# Patient Record
Sex: MALE | Race: WHITE | NOT HISPANIC OR LATINO | Employment: FULL TIME | URBAN - METROPOLITAN AREA
[De-identification: names, ages, dates, MRNs, and addresses within clinical notes are randomized per-mention and may not be internally consistent; named-entity substitution may affect disease eponyms.]

---

## 2017-06-23 ENCOUNTER — APPOINTMENT (EMERGENCY)
Dept: RADIOLOGY | Facility: HOSPITAL | Age: 52
End: 2017-06-23
Payer: COMMERCIAL

## 2017-06-23 ENCOUNTER — HOSPITAL ENCOUNTER (EMERGENCY)
Facility: HOSPITAL | Age: 52
Discharge: LEFT BEFORE DISCHARGE | End: 2017-06-23
Admitting: EMERGENCY MEDICINE
Payer: COMMERCIAL

## 2017-06-23 VITALS
RESPIRATION RATE: 20 BRPM | WEIGHT: 140 LBS | SYSTOLIC BLOOD PRESSURE: 117 MMHG | HEIGHT: 72 IN | DIASTOLIC BLOOD PRESSURE: 79 MMHG | BODY MASS INDEX: 18.96 KG/M2 | TEMPERATURE: 98.1 F | HEART RATE: 60 BPM | OXYGEN SATURATION: 97 %

## 2017-06-23 DIAGNOSIS — M79.671 RIGHT FOOT PAIN: ICD-10-CM

## 2017-06-23 DIAGNOSIS — M25.461 SWELLING OF JOINT OF RIGHT KNEE: Primary | ICD-10-CM

## 2017-06-23 LAB
ALBUMIN SERPL BCP-MCNC: 3.4 G/DL (ref 3.5–5)
ALP SERPL-CCNC: 62 U/L (ref 46–116)
ALT SERPL W P-5'-P-CCNC: 73 U/L (ref 12–78)
ANION GAP SERPL CALCULATED.3IONS-SCNC: 6 MMOL/L (ref 4–13)
APPEARANCE FLD: ABNORMAL
AST SERPL W P-5'-P-CCNC: 29 U/L (ref 5–45)
BASOPHILS # BLD AUTO: 0.02 THOUSANDS/ΜL (ref 0–0.1)
BASOPHILS NFR BLD AUTO: 0 % (ref 0–1)
BILIRUB SERPL-MCNC: 0.3 MG/DL (ref 0.2–1)
BUN SERPL-MCNC: 7 MG/DL (ref 5–25)
CALCIUM SERPL-MCNC: 8.9 MG/DL (ref 8.3–10.1)
CHLORIDE SERPL-SCNC: 99 MMOL/L (ref 100–108)
CO2 SERPL-SCNC: 30 MMOL/L (ref 21–32)
COLOR FLD: ABNORMAL
CREAT SERPL-MCNC: 0.65 MG/DL (ref 0.6–1.3)
CRYSTALS SNV QL MICRO: NORMAL
EOSINOPHIL # BLD AUTO: 0.72 THOUSAND/ΜL (ref 0–0.61)
EOSINOPHIL NFR BLD AUTO: 11 % (ref 0–6)
ERYTHROCYTE [DISTWIDTH] IN BLOOD BY AUTOMATED COUNT: 13 % (ref 11.6–15.1)
ERYTHROCYTE [SEDIMENTATION RATE] IN BLOOD: 53 MM/HOUR (ref 0–10)
GFR SERPL CREATININE-BSD FRML MDRD: >60 ML/MIN/1.73SQ M
GLUCOSE SERPL-MCNC: 85 MG/DL (ref 65–140)
HCT VFR BLD AUTO: 34.9 % (ref 36.5–49.3)
HGB BLD-MCNC: 11.6 G/DL (ref 12–17)
LYMPHOCYTES # BLD AUTO: 1.53 THOUSANDS/ΜL (ref 0.6–4.47)
LYMPHOCYTES # SNV MANUAL: 1 %
LYMPHOCYTES NFR BLD AUTO: 24 % (ref 14–44)
MCH RBC QN AUTO: 32.7 PG (ref 26.8–34.3)
MCHC RBC AUTO-ENTMCNC: 33.2 G/DL (ref 31.4–37.4)
MCV RBC AUTO: 98 FL (ref 82–98)
MONOCYTES # BLD AUTO: 0.76 THOUSAND/ΜL (ref 0.17–1.22)
MONOCYTES NFR BLD AUTO: 12 % (ref 4–12)
MONOCYTES NFR SNV MANUAL: 7 %
NEUTROPHILS # BLD AUTO: 3.31 THOUSANDS/ΜL (ref 1.85–7.62)
NEUTROPHILS NFR SNV MANUAL: 92 %
NEUTS SEG NFR BLD AUTO: 53 % (ref 43–75)
PLATELET # BLD AUTO: 160 THOUSANDS/UL (ref 149–390)
PMV BLD AUTO: 10.3 FL (ref 8.9–12.7)
POTASSIUM SERPL-SCNC: 4.1 MMOL/L (ref 3.5–5.3)
PROT SERPL-MCNC: 8.2 G/DL (ref 6.4–8.2)
RBC # BLD AUTO: 3.55 MILLION/UL (ref 3.88–5.62)
SITE: ABNORMAL
SODIUM SERPL-SCNC: 135 MMOL/L (ref 136–145)
TOTAL CELLS COUNTED SPEC: 100
URATE SERPL-MCNC: 7.2 MG/DL (ref 4.2–8)
WBC # BLD AUTO: 6.34 THOUSAND/UL (ref 4.31–10.16)
WBC # FLD MANUAL: ABNORMAL /UL (ref 0–200)

## 2017-06-23 PROCEDURE — 99284 EMERGENCY DEPT VISIT MOD MDM: CPT

## 2017-06-23 PROCEDURE — 85652 RBC SED RATE AUTOMATED: CPT | Performed by: PHYSICIAN ASSISTANT

## 2017-06-23 PROCEDURE — 85025 COMPLETE CBC W/AUTO DIFF WBC: CPT | Performed by: PHYSICIAN ASSISTANT

## 2017-06-23 PROCEDURE — 86618 LYME DISEASE ANTIBODY: CPT | Performed by: PHYSICIAN ASSISTANT

## 2017-06-23 PROCEDURE — 80053 COMPREHEN METABOLIC PANEL: CPT | Performed by: PHYSICIAN ASSISTANT

## 2017-06-23 PROCEDURE — 84550 ASSAY OF BLOOD/URIC ACID: CPT | Performed by: PHYSICIAN ASSISTANT

## 2017-06-23 PROCEDURE — 89060 EXAM SYNOVIAL FLUID CRYSTALS: CPT | Performed by: PHYSICIAN ASSISTANT

## 2017-06-23 PROCEDURE — 36415 COLL VENOUS BLD VENIPUNCTURE: CPT | Performed by: PHYSICIAN ASSISTANT

## 2017-06-23 PROCEDURE — 89051 BODY FLUID CELL COUNT: CPT | Performed by: PHYSICIAN ASSISTANT

## 2017-06-23 PROCEDURE — 73564 X-RAY EXAM KNEE 4 OR MORE: CPT

## 2017-06-23 PROCEDURE — 87205 SMEAR GRAM STAIN: CPT | Performed by: PHYSICIAN ASSISTANT

## 2017-06-23 PROCEDURE — 87070 CULTURE OTHR SPECIMN AEROBIC: CPT | Performed by: PHYSICIAN ASSISTANT

## 2017-06-23 RX ORDER — LIDOCAINE HYDROCHLORIDE 10 MG/ML
5 INJECTION, SOLUTION EPIDURAL; INFILTRATION; INTRACAUDAL; PERINEURAL ONCE
Status: COMPLETED | OUTPATIENT
Start: 2017-06-23 | End: 2017-06-23

## 2017-06-23 RX ORDER — INDOMETHACIN 25 MG/1
25 CAPSULE ORAL ONCE
Status: COMPLETED | OUTPATIENT
Start: 2017-06-23 | End: 2017-06-23

## 2017-06-23 RX ADMIN — INDOMETHACIN 25 MG: 25 CAPSULE ORAL at 15:51

## 2017-06-23 RX ADMIN — LIDOCAINE HYDROCHLORIDE 5 ML: 10 INJECTION, SOLUTION EPIDURAL; INFILTRATION; INTRACAUDAL; PERINEURAL at 17:15

## 2017-06-24 ENCOUNTER — APPOINTMENT (INPATIENT)
Dept: RADIOLOGY | Facility: HOSPITAL | Age: 52
DRG: 554 | End: 2017-06-24
Payer: COMMERCIAL

## 2017-06-24 ENCOUNTER — HOSPITAL ENCOUNTER (INPATIENT)
Facility: HOSPITAL | Age: 52
LOS: 3 days | Discharge: HOME | DRG: 554 | End: 2017-06-27
Attending: EMERGENCY MEDICINE | Admitting: INTERNAL MEDICINE
Payer: COMMERCIAL

## 2017-06-24 DIAGNOSIS — F10.929 ALCOHOL INTOXICATION (HCC): ICD-10-CM

## 2017-06-24 DIAGNOSIS — F10.920 ALCOHOL INTOXICATION, UNCOMPLICATED (HCC): Primary | ICD-10-CM

## 2017-06-24 DIAGNOSIS — M00.9 INFECTION OF RIGHT KNEE (HCC): ICD-10-CM

## 2017-06-24 DIAGNOSIS — M25.50 ARTHRALGIA, UNSPECIFIED JOINT: ICD-10-CM

## 2017-06-24 PROBLEM — M25.579 ANKLE PAIN: Status: ACTIVE | Noted: 2017-06-24

## 2017-06-24 PROBLEM — M10.9 ACUTE GOUT OF RIGHT FOOT: Status: ACTIVE | Noted: 2017-06-24

## 2017-06-24 LAB
ALBUMIN SERPL BCP-MCNC: 3.2 G/DL (ref 3.5–5)
ALP SERPL-CCNC: 64 U/L (ref 46–116)
ALT SERPL W P-5'-P-CCNC: 61 U/L (ref 12–78)
AMPHETAMINES SERPL QL SCN: NEGATIVE
ANION GAP SERPL CALCULATED.3IONS-SCNC: 7 MMOL/L (ref 4–13)
AST SERPL W P-5'-P-CCNC: 25 U/L (ref 5–45)
BARBITURATES UR QL: NEGATIVE
BASOPHILS # BLD AUTO: 0.03 THOUSANDS/ΜL (ref 0–0.1)
BASOPHILS NFR BLD AUTO: 0 % (ref 0–1)
BENZODIAZ UR QL: NEGATIVE
BILIRUB SERPL-MCNC: 0.2 MG/DL (ref 0.2–1)
BUN SERPL-MCNC: 6 MG/DL (ref 5–25)
CALCIUM SERPL-MCNC: 8.4 MG/DL (ref 8.3–10.1)
CHLORIDE SERPL-SCNC: 102 MMOL/L (ref 100–108)
CO2 SERPL-SCNC: 31 MMOL/L (ref 21–32)
COCAINE UR QL: NEGATIVE
CREAT SERPL-MCNC: 0.83 MG/DL (ref 0.6–1.3)
EOSINOPHIL # BLD AUTO: 0.73 THOUSAND/ΜL (ref 0–0.61)
EOSINOPHIL NFR BLD AUTO: 10 % (ref 0–6)
ERYTHROCYTE [DISTWIDTH] IN BLOOD BY AUTOMATED COUNT: 12.8 % (ref 11.6–15.1)
ERYTHROCYTE [DISTWIDTH] IN BLOOD BY AUTOMATED COUNT: 12.8 % (ref 11.6–15.1)
ERYTHROCYTE [SEDIMENTATION RATE] IN BLOOD: 50 MM/HOUR (ref 0–10)
ETHANOL SERPL-MCNC: 343 MG/DL (ref 0–3)
GFR SERPL CREATININE-BSD FRML MDRD: >60 ML/MIN/1.73SQ M
GLUCOSE SERPL-MCNC: 103 MG/DL (ref 65–140)
HCT VFR BLD AUTO: 35.7 % (ref 36.5–49.3)
HCT VFR BLD AUTO: 38.7 % (ref 36.5–49.3)
HGB BLD-MCNC: 12.1 G/DL (ref 12–17)
HGB BLD-MCNC: 13.2 G/DL (ref 12–17)
LYMPHOCYTES # BLD AUTO: 3.34 THOUSANDS/ΜL (ref 0.6–4.47)
LYMPHOCYTES NFR BLD AUTO: 44 % (ref 14–44)
MAGNESIUM SERPL-MCNC: 1.9 MG/DL (ref 1.6–2.6)
MCH RBC QN AUTO: 32.5 PG (ref 26.8–34.3)
MCH RBC QN AUTO: 32.6 PG (ref 26.8–34.3)
MCHC RBC AUTO-ENTMCNC: 33.9 G/DL (ref 31.4–37.4)
MCHC RBC AUTO-ENTMCNC: 34.1 G/DL (ref 31.4–37.4)
MCV RBC AUTO: 96 FL (ref 82–98)
MCV RBC AUTO: 96 FL (ref 82–98)
METHADONE UR QL: NEGATIVE
MONOCYTES # BLD AUTO: 0.66 THOUSAND/ΜL (ref 0.17–1.22)
MONOCYTES NFR BLD AUTO: 9 % (ref 4–12)
NEUTROPHILS # BLD AUTO: 2.77 THOUSANDS/ΜL (ref 1.85–7.62)
NEUTS SEG NFR BLD AUTO: 37 % (ref 43–75)
OPIATES UR QL SCN: POSITIVE
PCP UR QL: NEGATIVE
PLATELET # BLD AUTO: 176 THOUSANDS/UL (ref 149–390)
PLATELET # BLD AUTO: 225 THOUSANDS/UL (ref 149–390)
PMV BLD AUTO: 9.3 FL (ref 8.9–12.7)
PMV BLD AUTO: 9.7 FL (ref 8.9–12.7)
POTASSIUM SERPL-SCNC: 3.6 MMOL/L (ref 3.5–5.3)
PROT SERPL-MCNC: 7.8 G/DL (ref 6.4–8.2)
RBC # BLD AUTO: 3.72 MILLION/UL (ref 3.88–5.62)
RBC # BLD AUTO: 4.05 MILLION/UL (ref 3.88–5.62)
SODIUM SERPL-SCNC: 140 MMOL/L (ref 136–145)
THC UR QL: NEGATIVE
URATE SERPL-MCNC: 8.3 MG/DL (ref 4.2–8)
WBC # BLD AUTO: 4.33 THOUSAND/UL (ref 4.31–10.16)
WBC # BLD AUTO: 7.53 THOUSAND/UL (ref 4.31–10.16)

## 2017-06-24 PROCEDURE — 80053 COMPREHEN METABOLIC PANEL: CPT | Performed by: EMERGENCY MEDICINE

## 2017-06-24 PROCEDURE — 80053 COMPREHEN METABOLIC PANEL: CPT | Performed by: NURSE PRACTITIONER

## 2017-06-24 PROCEDURE — 80307 DRUG TEST PRSMV CHEM ANLYZR: CPT | Performed by: EMERGENCY MEDICINE

## 2017-06-24 PROCEDURE — 96374 THER/PROPH/DIAG INJ IV PUSH: CPT

## 2017-06-24 PROCEDURE — 85025 COMPLETE CBC W/AUTO DIFF WBC: CPT | Performed by: EMERGENCY MEDICINE

## 2017-06-24 PROCEDURE — 83735 ASSAY OF MAGNESIUM: CPT | Performed by: EMERGENCY MEDICINE

## 2017-06-24 PROCEDURE — 85027 COMPLETE CBC AUTOMATED: CPT | Performed by: NURSE PRACTITIONER

## 2017-06-24 PROCEDURE — 83735 ASSAY OF MAGNESIUM: CPT | Performed by: NURSE PRACTITIONER

## 2017-06-24 PROCEDURE — 36415 COLL VENOUS BLD VENIPUNCTURE: CPT | Performed by: EMERGENCY MEDICINE

## 2017-06-24 PROCEDURE — 84100 ASSAY OF PHOSPHORUS: CPT | Performed by: NURSE PRACTITIONER

## 2017-06-24 PROCEDURE — 85652 RBC SED RATE AUTOMATED: CPT | Performed by: EMERGENCY MEDICINE

## 2017-06-24 PROCEDURE — 84550 ASSAY OF BLOOD/URIC ACID: CPT | Performed by: NURSE PRACTITIONER

## 2017-06-24 PROCEDURE — 73630 X-RAY EXAM OF FOOT: CPT

## 2017-06-24 PROCEDURE — 85610 PROTHROMBIN TIME: CPT | Performed by: NURSE PRACTITIONER

## 2017-06-24 PROCEDURE — 85730 THROMBOPLASTIN TIME PARTIAL: CPT | Performed by: NURSE PRACTITIONER

## 2017-06-24 PROCEDURE — 80320 DRUG SCREEN QUANTALCOHOLS: CPT | Performed by: EMERGENCY MEDICINE

## 2017-06-24 PROCEDURE — 99285 EMERGENCY DEPT VISIT HI MDM: CPT

## 2017-06-24 RX ORDER — VANCOMYCIN HYDROCHLORIDE 1 G/200ML
15 INJECTION, SOLUTION INTRAVENOUS EVERY 12 HOURS
Status: DISCONTINUED | OUTPATIENT
Start: 2017-06-25 | End: 2017-06-26

## 2017-06-24 RX ORDER — INDOMETHACIN 25 MG/1
50 CAPSULE ORAL
Status: DISCONTINUED | OUTPATIENT
Start: 2017-06-25 | End: 2017-06-27 | Stop reason: HOSPADM

## 2017-06-24 RX ORDER — COLCHICINE 0.6 MG/1
1.2 TABLET ORAL ONCE
Status: COMPLETED | OUTPATIENT
Start: 2017-06-24 | End: 2017-06-24

## 2017-06-24 RX ORDER — COLCHICINE 0.6 MG/1
0.6 TABLET ORAL 2 TIMES DAILY
Status: DISCONTINUED | OUTPATIENT
Start: 2017-06-25 | End: 2017-06-27 | Stop reason: HOSPADM

## 2017-06-24 RX ORDER — VANCOMYCIN HYDROCHLORIDE 1 G/200ML
15 INJECTION, SOLUTION INTRAVENOUS ONCE
Status: COMPLETED | OUTPATIENT
Start: 2017-06-24 | End: 2017-06-24

## 2017-06-24 RX ORDER — CALCIUM CARBONATE 200(500)MG
1000 TABLET,CHEWABLE ORAL DAILY PRN
Status: DISCONTINUED | OUTPATIENT
Start: 2017-06-24 | End: 2017-06-27 | Stop reason: HOSPADM

## 2017-06-24 RX ORDER — BUPRENORPHINE AND NALOXONE 8; 2 MG/1; MG/1
1 FILM, SOLUBLE BUCCAL; SUBLINGUAL 2 TIMES DAILY
Status: DISCONTINUED | OUTPATIENT
Start: 2017-06-24 | End: 2017-06-27 | Stop reason: HOSPADM

## 2017-06-24 RX ORDER — BUPRENORPHINE HYDROCHLORIDE AND NALOXONE HYDROCHLORIDE DIHYDRATE 8; 2 MG/1; MG/1
1 TABLET SUBLINGUAL 2 TIMES DAILY
COMMUNITY
End: 2021-08-04 | Stop reason: HOSPADM

## 2017-06-24 RX ORDER — SODIUM CHLORIDE 9 MG/ML
125 INJECTION, SOLUTION INTRAVENOUS CONTINUOUS
Status: DISCONTINUED | OUTPATIENT
Start: 2017-06-24 | End: 2017-06-24 | Stop reason: ALTCHOICE

## 2017-06-24 RX ORDER — HEPARIN SODIUM 5000 [USP'U]/ML
5000 INJECTION, SOLUTION INTRAVENOUS; SUBCUTANEOUS EVERY 8 HOURS SCHEDULED
Status: DISCONTINUED | OUTPATIENT
Start: 2017-06-24 | End: 2017-06-24

## 2017-06-24 RX ORDER — SODIUM CHLORIDE 9 MG/ML
250 INJECTION, SOLUTION INTRAVENOUS CONTINUOUS
Status: DISCONTINUED | OUTPATIENT
Start: 2017-06-24 | End: 2017-06-24 | Stop reason: ALTCHOICE

## 2017-06-24 RX ORDER — NICOTINE 21 MG/24HR
1 PATCH, TRANSDERMAL 24 HOURS TRANSDERMAL DAILY
Status: DISCONTINUED | OUTPATIENT
Start: 2017-06-25 | End: 2017-06-27 | Stop reason: HOSPADM

## 2017-06-24 RX ORDER — ACETAMINOPHEN 325 MG/1
650 TABLET ORAL EVERY 6 HOURS PRN
Status: DISCONTINUED | OUTPATIENT
Start: 2017-06-24 | End: 2017-06-27 | Stop reason: HOSPADM

## 2017-06-24 RX ORDER — SODIUM CHLORIDE 9 MG/ML
125 INJECTION, SOLUTION INTRAVENOUS CONTINUOUS
Status: DISCONTINUED | OUTPATIENT
Start: 2017-06-25 | End: 2017-06-25

## 2017-06-24 RX ORDER — ONDANSETRON 2 MG/ML
4 INJECTION INTRAMUSCULAR; INTRAVENOUS EVERY 6 HOURS PRN
Status: DISCONTINUED | OUTPATIENT
Start: 2017-06-24 | End: 2017-06-27 | Stop reason: HOSPADM

## 2017-06-24 RX ADMIN — SODIUM CHLORIDE 125 ML/HR: 0.9 INJECTION, SOLUTION INTRAVENOUS at 21:30

## 2017-06-24 RX ADMIN — Medication 1 TABLET: at 21:44

## 2017-06-24 RX ADMIN — PREDNISONE 30 MG: 20 TABLET ORAL at 22:40

## 2017-06-24 RX ADMIN — FOLIC ACID: 5 INJECTION, SOLUTION INTRAMUSCULAR; INTRAVENOUS; SUBCUTANEOUS at 22:35

## 2017-06-24 RX ADMIN — VANCOMYCIN HYDROCHLORIDE 750 MG: 750 INJECTION, SOLUTION INTRAVENOUS at 23:00

## 2017-06-24 RX ADMIN — CEFEPIME 2000 MG: 2 INJECTION, POWDER, FOR SOLUTION INTRAMUSCULAR; INTRAVENOUS at 20:03

## 2017-06-24 RX ADMIN — COLCHICINE 1.2 MG: 0.6 TABLET, FILM COATED ORAL at 21:44

## 2017-06-24 RX ADMIN — SODIUM CHLORIDE 250 ML/HR: 0.9 INJECTION, SOLUTION INTRAVENOUS at 20:08

## 2017-06-24 RX ADMIN — VANCOMYCIN HYDROCHLORIDE 1000 MG: 1 INJECTION, SOLUTION INTRAVENOUS at 20:36

## 2017-06-25 PROBLEM — F11.20 NARCOTIC DEPENDENCY, CONTINUOUS (HCC): Status: ACTIVE | Noted: 2017-06-25

## 2017-06-25 LAB
ALBUMIN SERPL BCP-MCNC: 3.1 G/DL (ref 3.5–5)
ALP SERPL-CCNC: 64 U/L (ref 46–116)
ALT SERPL W P-5'-P-CCNC: 59 U/L (ref 12–78)
ANION GAP SERPL CALCULATED.3IONS-SCNC: 9 MMOL/L (ref 4–13)
APTT PPP: 31 SECONDS (ref 23–35)
AST SERPL W P-5'-P-CCNC: 26 U/L (ref 5–45)
BILIRUB SERPL-MCNC: 0.2 MG/DL (ref 0.2–1)
BUN SERPL-MCNC: 8 MG/DL (ref 5–25)
CALCIUM SERPL-MCNC: 8.4 MG/DL (ref 8.3–10.1)
CHLORIDE SERPL-SCNC: 105 MMOL/L (ref 100–108)
CO2 SERPL-SCNC: 28 MMOL/L (ref 21–32)
CREAT SERPL-MCNC: 0.81 MG/DL (ref 0.6–1.3)
GFR SERPL CREATININE-BSD FRML MDRD: >60 ML/MIN/1.73SQ M
GLUCOSE SERPL-MCNC: 90 MG/DL (ref 65–140)
INR PPP: 1.02 (ref 0.86–1.16)
MAGNESIUM SERPL-MCNC: 1.9 MG/DL (ref 1.6–2.6)
PHOSPHATE SERPL-MCNC: 3.3 MG/DL (ref 2.7–4.5)
POTASSIUM SERPL-SCNC: 3.8 MMOL/L (ref 3.5–5.3)
PROT SERPL-MCNC: 7.5 G/DL (ref 6.4–8.2)
PROTHROMBIN TIME: 13.2 SECONDS (ref 12.1–14.4)
SODIUM SERPL-SCNC: 142 MMOL/L (ref 136–145)

## 2017-06-25 PROCEDURE — 87476 LYME DIS DNA AMP PROBE: CPT | Performed by: INTERNAL MEDICINE

## 2017-06-25 RX ORDER — SODIUM CHLORIDE 9 MG/ML
75 INJECTION, SOLUTION INTRAVENOUS CONTINUOUS
Status: DISCONTINUED | OUTPATIENT
Start: 2017-06-25 | End: 2017-06-27 | Stop reason: HOSPADM

## 2017-06-25 RX ORDER — LORAZEPAM 2 MG/ML
1 INJECTION INTRAMUSCULAR EVERY 6 HOURS PRN
Status: DISCONTINUED | OUTPATIENT
Start: 2017-06-25 | End: 2017-06-27 | Stop reason: HOSPADM

## 2017-06-25 RX ORDER — THIAMINE HYDROCHLORIDE 100 MG/ML
INJECTION, SOLUTION INTRAMUSCULAR; INTRAVENOUS
Status: DISCONTINUED
Start: 2017-06-25 | End: 2017-06-26 | Stop reason: WASHOUT

## 2017-06-25 RX ADMIN — COLCHICINE 0.6 MG: 0.6 TABLET, FILM COATED ORAL at 08:30

## 2017-06-25 RX ADMIN — PREDNISONE 30 MG: 20 TABLET ORAL at 08:30

## 2017-06-25 RX ADMIN — NICOTINE 1 PATCH: 21 PATCH, EXTENDED RELEASE TRANSDERMAL at 08:30

## 2017-06-25 RX ADMIN — VANCOMYCIN HYDROCHLORIDE 1000 MG: 1 INJECTION, SOLUTION INTRAVENOUS at 08:30

## 2017-06-25 RX ADMIN — INDOMETHACIN 50 MG: 25 CAPSULE ORAL at 17:11

## 2017-06-25 RX ADMIN — Medication 1 TABLET: at 22:23

## 2017-06-25 RX ADMIN — ACETAMINOPHEN 650 MG: 325 TABLET ORAL at 20:18

## 2017-06-25 RX ADMIN — BUPRENORPHINE HYDROCHLORIDE, NALOXONE HYDROCHLORIDE 1 FILM: 8; 2 FILM, SOLUBLE BUCCAL; SUBLINGUAL at 13:55

## 2017-06-25 RX ADMIN — INDOMETHACIN 50 MG: 25 CAPSULE ORAL at 11:03

## 2017-06-25 RX ADMIN — INDOMETHACIN 50 MG: 25 CAPSULE ORAL at 08:31

## 2017-06-25 RX ADMIN — COLCHICINE 0.6 MG: 0.6 TABLET, FILM COATED ORAL at 17:10

## 2017-06-25 RX ADMIN — VANCOMYCIN HYDROCHLORIDE 1000 MG: 1 INJECTION, SOLUTION INTRAVENOUS at 22:22

## 2017-06-25 RX ADMIN — CEFEPIME 2000 MG: 2 INJECTION, POWDER, FOR SOLUTION INTRAMUSCULAR; INTRAVENOUS at 10:01

## 2017-06-25 RX ADMIN — METOPROLOL TARTRATE 25 MG: 25 TABLET ORAL at 20:21

## 2017-06-25 RX ADMIN — BUPRENORPHINE HYDROCHLORIDE, NALOXONE HYDROCHLORIDE 1 FILM: 8; 2 FILM, SOLUBLE BUCCAL; SUBLINGUAL at 20:36

## 2017-06-25 RX ADMIN — ENOXAPARIN SODIUM 40 MG: 40 INJECTION SUBCUTANEOUS at 08:30

## 2017-06-25 RX ADMIN — CEFEPIME 2000 MG: 2 INJECTION, POWDER, FOR SOLUTION INTRAMUSCULAR; INTRAVENOUS at 20:18

## 2017-06-25 RX ADMIN — SODIUM CHLORIDE 125 ML/HR: 0.9 INJECTION, SOLUTION INTRAVENOUS at 06:39

## 2017-06-26 LAB
B BURGDOR IGG SER IA-ACNC: 0.09
B BURGDOR IGM SER IA-ACNC: 0.16
BACTERIA SPEC BFLD CULT: NO GROWTH
ERYTHROCYTE [DISTWIDTH] IN BLOOD BY AUTOMATED COUNT: 12.6 % (ref 11.6–15.1)
GRAM STN SPEC: NORMAL
GRAM STN SPEC: NORMAL
HCT VFR BLD AUTO: 35.8 % (ref 36.5–49.3)
HGB BLD-MCNC: 12.3 G/DL (ref 12–17)
MCH RBC QN AUTO: 31.9 PG (ref 26.8–34.3)
MCHC RBC AUTO-ENTMCNC: 34.4 G/DL (ref 31.4–37.4)
MCV RBC AUTO: 93 FL (ref 82–98)
PLATELET # BLD AUTO: 197 THOUSANDS/UL (ref 149–390)
PMV BLD AUTO: 10.6 FL (ref 8.9–12.7)
RBC # BLD AUTO: 3.85 MILLION/UL (ref 3.88–5.62)
VANCOMYCIN TROUGH SERPL-MCNC: 8.8 UG/ML (ref 10–20)
WBC # BLD AUTO: 4.97 THOUSAND/UL (ref 4.31–10.16)

## 2017-06-26 PROCEDURE — 85027 COMPLETE CBC AUTOMATED: CPT | Performed by: NURSE PRACTITIONER

## 2017-06-26 PROCEDURE — 80202 ASSAY OF VANCOMYCIN: CPT | Performed by: NURSE PRACTITIONER

## 2017-06-26 RX ORDER — FOLIC ACID 1 MG/1
1 TABLET ORAL DAILY
Status: DISCONTINUED | OUTPATIENT
Start: 2017-06-26 | End: 2017-06-27 | Stop reason: HOSPADM

## 2017-06-26 RX ORDER — THIAMINE MONONITRATE (VIT B1) 100 MG
100 TABLET ORAL DAILY
Status: DISCONTINUED | OUTPATIENT
Start: 2017-06-26 | End: 2017-06-27 | Stop reason: HOSPADM

## 2017-06-26 RX ADMIN — SODIUM CHLORIDE 75 ML/HR: 0.9 INJECTION, SOLUTION INTRAVENOUS at 08:26

## 2017-06-26 RX ADMIN — ENOXAPARIN SODIUM 40 MG: 40 INJECTION SUBCUTANEOUS at 08:28

## 2017-06-26 RX ADMIN — CEFEPIME 2000 MG: 2 INJECTION, POWDER, FOR SOLUTION INTRAMUSCULAR; INTRAVENOUS at 08:28

## 2017-06-26 RX ADMIN — INDOMETHACIN 50 MG: 25 CAPSULE ORAL at 08:28

## 2017-06-26 RX ADMIN — COLCHICINE 0.6 MG: 0.6 TABLET, FILM COATED ORAL at 08:27

## 2017-06-26 RX ADMIN — VANCOMYCIN HYDROCHLORIDE 1500 MG: 1 INJECTION, POWDER, LYOPHILIZED, FOR SOLUTION INTRAVENOUS at 21:58

## 2017-06-26 RX ADMIN — INDOMETHACIN 50 MG: 25 CAPSULE ORAL at 13:02

## 2017-06-26 RX ADMIN — Medication 100 MG: at 10:52

## 2017-06-26 RX ADMIN — Medication 1 TABLET: at 21:58

## 2017-06-26 RX ADMIN — FOLIC ACID: 5 INJECTION, SOLUTION INTRAMUSCULAR; INTRAVENOUS; SUBCUTANEOUS at 00:49

## 2017-06-26 RX ADMIN — COLCHICINE 0.6 MG: 0.6 TABLET, FILM COATED ORAL at 17:09

## 2017-06-26 RX ADMIN — FOLIC ACID 1 MG: 1 TABLET ORAL at 10:52

## 2017-06-26 RX ADMIN — NICOTINE 1 PATCH: 21 PATCH, EXTENDED RELEASE TRANSDERMAL at 08:21

## 2017-06-26 RX ADMIN — INDOMETHACIN 50 MG: 25 CAPSULE ORAL at 17:09

## 2017-06-26 RX ADMIN — BUPRENORPHINE HYDROCHLORIDE, NALOXONE HYDROCHLORIDE 1 FILM: 8; 2 FILM, SOLUBLE BUCCAL; SUBLINGUAL at 08:47

## 2017-06-26 RX ADMIN — METOPROLOL TARTRATE 25 MG: 25 TABLET ORAL at 21:58

## 2017-06-26 RX ADMIN — CEFEPIME 2000 MG: 2 INJECTION, POWDER, FOR SOLUTION INTRAMUSCULAR; INTRAVENOUS at 20:10

## 2017-06-26 RX ADMIN — BUPRENORPHINE HYDROCHLORIDE, NALOXONE HYDROCHLORIDE 1 FILM: 8; 2 FILM, SOLUBLE BUCCAL; SUBLINGUAL at 17:09

## 2017-06-27 VITALS
HEART RATE: 52 BPM | TEMPERATURE: 97.6 F | BODY MASS INDEX: 22.08 KG/M2 | HEIGHT: 67 IN | WEIGHT: 140.65 LBS | OXYGEN SATURATION: 97 % | RESPIRATION RATE: 17 BRPM | DIASTOLIC BLOOD PRESSURE: 96 MMHG | SYSTOLIC BLOOD PRESSURE: 157 MMHG

## 2017-06-27 PROBLEM — M25.561 RIGHT KNEE PAIN: Status: ACTIVE | Noted: 2017-06-27

## 2017-06-27 PROBLEM — M25.461 EFFUSION OF RIGHT KNEE: Status: ACTIVE | Noted: 2017-06-27

## 2017-06-27 PROBLEM — M00.9 INFECTION OF RIGHT KNEE (HCC): Status: RESOLVED | Noted: 2017-06-24 | Resolved: 2017-06-27

## 2017-06-27 PROBLEM — M13.161 INFLAMMATION OF JOINT OF RIGHT KNEE: Status: ACTIVE | Noted: 2017-06-27

## 2017-06-27 LAB
ANION GAP SERPL CALCULATED.3IONS-SCNC: 8 MMOL/L (ref 4–13)
B BURGDOR DNA SPEC QL NAA+PROBE: NEGATIVE
BASOPHILS # BLD AUTO: 0.01 THOUSANDS/ΜL (ref 0–0.1)
BASOPHILS NFR BLD AUTO: 0 % (ref 0–1)
BUN SERPL-MCNC: 10 MG/DL (ref 5–25)
CALCIUM SERPL-MCNC: 8.3 MG/DL (ref 8.3–10.1)
CHLORIDE SERPL-SCNC: 104 MMOL/L (ref 100–108)
CO2 SERPL-SCNC: 26 MMOL/L (ref 21–32)
CREAT SERPL-MCNC: 0.71 MG/DL (ref 0.6–1.3)
EOSINOPHIL # BLD AUTO: 0.17 THOUSAND/ΜL (ref 0–0.61)
EOSINOPHIL NFR BLD AUTO: 4 % (ref 0–6)
ERYTHROCYTE [DISTWIDTH] IN BLOOD BY AUTOMATED COUNT: 12.7 % (ref 11.6–15.1)
GFR SERPL CREATININE-BSD FRML MDRD: >60 ML/MIN/1.73SQ M
GLUCOSE SERPL-MCNC: 104 MG/DL (ref 65–140)
HCT VFR BLD AUTO: 37.6 % (ref 36.5–49.3)
HGB BLD-MCNC: 12.8 G/DL (ref 12–17)
LYMPHOCYTES # BLD AUTO: 1.72 THOUSANDS/ΜL (ref 0.6–4.47)
LYMPHOCYTES NFR BLD AUTO: 35 % (ref 14–44)
MCH RBC QN AUTO: 31.6 PG (ref 26.8–34.3)
MCHC RBC AUTO-ENTMCNC: 34 G/DL (ref 31.4–37.4)
MCV RBC AUTO: 93 FL (ref 82–98)
MONOCYTES # BLD AUTO: 0.38 THOUSAND/ΜL (ref 0.17–1.22)
MONOCYTES NFR BLD AUTO: 8 % (ref 4–12)
NEUTROPHILS # BLD AUTO: 2.59 THOUSANDS/ΜL (ref 1.85–7.62)
NEUTS SEG NFR BLD AUTO: 53 % (ref 43–75)
PLATELET # BLD AUTO: 183 THOUSANDS/UL (ref 149–390)
PMV BLD AUTO: 9.7 FL (ref 8.9–12.7)
POTASSIUM SERPL-SCNC: 3.6 MMOL/L (ref 3.5–5.3)
RBC # BLD AUTO: 4.05 MILLION/UL (ref 3.88–5.62)
SODIUM SERPL-SCNC: 138 MMOL/L (ref 136–145)
WBC # BLD AUTO: 4.87 THOUSAND/UL (ref 4.31–10.16)

## 2017-06-27 PROCEDURE — 80048 BASIC METABOLIC PNL TOTAL CA: CPT | Performed by: FAMILY MEDICINE

## 2017-06-27 PROCEDURE — 85025 COMPLETE CBC W/AUTO DIFF WBC: CPT | Performed by: FAMILY MEDICINE

## 2017-06-27 RX ORDER — NICOTINE 21 MG/24HR
1 PATCH, TRANSDERMAL 24 HOURS TRANSDERMAL DAILY
Qty: 28 PATCH | Refills: 0 | Status: SHIPPED | OUTPATIENT
Start: 2017-06-27 | End: 2018-04-12 | Stop reason: ALTCHOICE

## 2017-06-27 RX ORDER — COLCHICINE 0.6 MG/1
0.6 TABLET ORAL 2 TIMES DAILY
Qty: 60 TABLET | Refills: 0 | Status: SHIPPED | OUTPATIENT
Start: 2017-06-27 | End: 2018-04-12 | Stop reason: ALTCHOICE

## 2017-06-27 RX ORDER — INDOMETHACIN 50 MG/1
50 CAPSULE ORAL
Qty: 21 CAPSULE | Refills: 0 | Status: SHIPPED | OUTPATIENT
Start: 2017-06-27 | End: 2018-04-12 | Stop reason: ALTCHOICE

## 2017-06-27 RX ADMIN — FOLIC ACID 1 MG: 1 TABLET ORAL at 08:24

## 2017-06-27 RX ADMIN — NICOTINE 1 PATCH: 21 PATCH, EXTENDED RELEASE TRANSDERMAL at 08:25

## 2017-06-27 RX ADMIN — METOPROLOL TARTRATE 25 MG: 25 TABLET ORAL at 08:24

## 2017-06-27 RX ADMIN — Medication 100 MG: at 08:23

## 2017-06-27 RX ADMIN — CEFEPIME 2000 MG: 2 INJECTION, POWDER, FOR SOLUTION INTRAMUSCULAR; INTRAVENOUS at 08:23

## 2017-06-27 RX ADMIN — INDOMETHACIN 50 MG: 25 CAPSULE ORAL at 08:24

## 2017-06-27 RX ADMIN — ENOXAPARIN SODIUM 40 MG: 40 INJECTION SUBCUTANEOUS at 08:23

## 2017-06-27 RX ADMIN — BUPRENORPHINE HYDROCHLORIDE, NALOXONE HYDROCHLORIDE 1 FILM: 8; 2 FILM, SOLUBLE BUCCAL; SUBLINGUAL at 08:23

## 2017-06-27 RX ADMIN — COLCHICINE 0.6 MG: 0.6 TABLET, FILM COATED ORAL at 08:23

## 2017-06-27 RX ADMIN — INDOMETHACIN 50 MG: 25 CAPSULE ORAL at 11:53

## 2017-06-27 RX ADMIN — VANCOMYCIN HYDROCHLORIDE 1500 MG: 1 INJECTION, POWDER, LYOPHILIZED, FOR SOLUTION INTRAVENOUS at 08:33

## 2018-04-12 ENCOUNTER — APPOINTMENT (EMERGENCY)
Dept: RADIOLOGY | Facility: HOSPITAL | Age: 53
End: 2018-04-12
Payer: COMMERCIAL

## 2018-04-12 ENCOUNTER — HOSPITAL ENCOUNTER (EMERGENCY)
Facility: HOSPITAL | Age: 53
Discharge: HOME | End: 2018-04-12
Attending: EMERGENCY MEDICINE | Admitting: EMERGENCY MEDICINE
Payer: COMMERCIAL

## 2018-04-12 VITALS
RESPIRATION RATE: 20 BRPM | HEART RATE: 84 BPM | OXYGEN SATURATION: 99 % | SYSTOLIC BLOOD PRESSURE: 176 MMHG | TEMPERATURE: 98.1 F | WEIGHT: 145 LBS | DIASTOLIC BLOOD PRESSURE: 108 MMHG | BODY MASS INDEX: 22.71 KG/M2

## 2018-04-12 DIAGNOSIS — M79.672 LEFT FOOT PAIN: ICD-10-CM

## 2018-04-12 DIAGNOSIS — M25.562 LEFT KNEE PAIN: Primary | ICD-10-CM

## 2018-04-12 DIAGNOSIS — M25.462 EFFUSION OF LEFT KNEE: ICD-10-CM

## 2018-04-12 LAB
ANION GAP SERPL CALCULATED.3IONS-SCNC: 8 MMOL/L (ref 4–13)
BASOPHILS # BLD AUTO: 0.02 THOUSANDS/ΜL (ref 0–0.1)
BASOPHILS NFR BLD AUTO: 0 % (ref 0–1)
BUN SERPL-MCNC: 10 MG/DL (ref 5–25)
CALCIUM SERPL-MCNC: 8.5 MG/DL
CHLORIDE SERPL-SCNC: 99 MMOL/L (ref 100–108)
CO2 SERPL-SCNC: 30 MMOL/L (ref 21–32)
CREAT SERPL-MCNC: 0.62 MG/DL (ref 0.6–1.3)
EOSINOPHIL # BLD AUTO: 0.16 THOUSAND/ΜL (ref 0–0.61)
EOSINOPHIL NFR BLD AUTO: 2 % (ref 0–6)
ERYTHROCYTE [DISTWIDTH] IN BLOOD BY AUTOMATED COUNT: 13.3 % (ref 11.6–15.1)
GFR SERPL CREATININE-BSD FRML MDRD: 112 ML/MIN/1.73SQ M
GLUCOSE SERPL-MCNC: 101 MG/DL (ref 65–140)
HCT VFR BLD AUTO: 38.9 % (ref 36.5–49.3)
HGB BLD-MCNC: 13 G/DL (ref 12–17)
LYMPHOCYTES # BLD AUTO: 2.21 THOUSANDS/ΜL (ref 0.6–4.47)
LYMPHOCYTES NFR BLD AUTO: 31 % (ref 14–44)
MCH RBC QN AUTO: 31.5 PG (ref 26.8–34.3)
MCHC RBC AUTO-ENTMCNC: 33.4 G/DL (ref 31.4–37.4)
MCV RBC AUTO: 94 FL (ref 82–98)
MONOCYTES # BLD AUTO: 0.9 THOUSAND/ΜL (ref 0.17–1.22)
MONOCYTES NFR BLD AUTO: 13 % (ref 4–12)
NEUTROPHILS # BLD AUTO: 3.87 THOUSANDS/ΜL (ref 1.85–7.62)
NEUTS SEG NFR BLD AUTO: 54 % (ref 43–75)
PLATELET # BLD AUTO: 234 THOUSANDS/UL (ref 149–390)
PMV BLD AUTO: 9.6 FL (ref 8.9–12.7)
POTASSIUM SERPL-SCNC: 4.2 MMOL/L (ref 3.5–5.3)
RBC # BLD AUTO: 4.13 MILLION/UL (ref 3.88–5.62)
SODIUM SERPL-SCNC: 137 MMOL/L (ref 136–145)
URATE SERPL-MCNC: 6.7 MG/DL (ref 4.2–8)
WBC # BLD AUTO: 7.16 THOUSAND/UL (ref 4.31–10.16)

## 2018-04-12 PROCEDURE — 36415 COLL VENOUS BLD VENIPUNCTURE: CPT | Performed by: EMERGENCY MEDICINE

## 2018-04-12 PROCEDURE — 85025 COMPLETE CBC W/AUTO DIFF WBC: CPT | Performed by: EMERGENCY MEDICINE

## 2018-04-12 PROCEDURE — 99283 EMERGENCY DEPT VISIT LOW MDM: CPT

## 2018-04-12 PROCEDURE — 73562 X-RAY EXAM OF KNEE 3: CPT

## 2018-04-12 PROCEDURE — 73630 X-RAY EXAM OF FOOT: CPT

## 2018-04-12 PROCEDURE — 84550 ASSAY OF BLOOD/URIC ACID: CPT | Performed by: EMERGENCY MEDICINE

## 2018-04-12 PROCEDURE — 80048 BASIC METABOLIC PNL TOTAL CA: CPT | Performed by: EMERGENCY MEDICINE

## 2018-04-12 PROCEDURE — 96372 THER/PROPH/DIAG INJ SC/IM: CPT

## 2018-04-12 RX ORDER — KETOROLAC TROMETHAMINE 30 MG/ML
60 INJECTION, SOLUTION INTRAMUSCULAR; INTRAVENOUS ONCE
Status: COMPLETED | OUTPATIENT
Start: 2018-04-12 | End: 2018-04-12

## 2018-04-12 RX ORDER — INDOMETHACIN 50 MG/1
50 CAPSULE ORAL 3 TIMES DAILY PRN
Qty: 30 CAPSULE | Refills: 0 | Status: ON HOLD | OUTPATIENT
Start: 2018-04-12 | End: 2021-08-01 | Stop reason: ALTCHOICE

## 2018-04-12 RX ORDER — KETOROLAC TROMETHAMINE 30 MG/ML
30 INJECTION, SOLUTION INTRAMUSCULAR; INTRAVENOUS ONCE
Status: DISCONTINUED | OUTPATIENT
Start: 2018-04-12 | End: 2018-04-12

## 2018-04-12 RX ADMIN — KETOROLAC TROMETHAMINE 60 MG: 30 INJECTION, SOLUTION INTRAMUSCULAR; INTRAVENOUS at 13:36

## 2018-04-12 NOTE — DISCHARGE INSTRUCTIONS
Knee Pain   WHAT YOU NEED TO KNOW:   Knee pain may start suddenly, or it may be a long-term problem  You may have pain on the side, front, or back of your knee  You may have knee stiffness and swelling  You may hear popping sounds or feel like your knee is giving way or locking up as you walk  You may feel pain when you sit, stand, walk, or climb up and down stairs  Knee pain can be caused by conditions such as obesity, inflammation, or strains or tears in ligaments or tendons  DISCHARGE INSTRUCTIONS:   Follow up with your healthcare provider within 24 hours or as directed: You may need follow-up treatments, such as steroid injections to decrease pain  Write down your questions so you remember to ask them during your visits  Self-care:   · Rest  your knee so it can heal  Limit activities that increase your pain  · Ice  can help reduce swelling  Wrap ice in a towel and put it on your knee for as long and as often as directed  · Compression  with a brace or bandage can help reduce swelling  Use a brace or bandage only as directed  · Elevation  helps decrease pain and swelling  Elevate your knee while you are sitting or lying down  Prop your leg on pillows to keep your knee above the level of your heart  Medicines:   · NSAIDs  help decrease swelling and pain or fever  This medicine is available with or without a doctor's order  NSAIDs can cause stomach bleeding or kidney problems in certain people  If you take blood thinner medicine, always ask your healthcare provider if NSAIDs are safe for you  Always read the medicine label and follow directions  · Acetaminophen  decreases pain and fever  It is available without a doctor's order  Ask how much to take and when to take it  Follow directions  Acetaminophen can cause liver damage if not taken correctly  · Take your medicine as directed  Contact your healthcare provider if you think your medicine is not helping or if you have side effects   Tell him or her if you are allergic to any medicine  Keep a list of the medicines, vitamins, and herbs you take  Include the amounts, and when and why you take them  Bring the list or the pill bottles to follow-up visits  Carry your medicine list with you in case of an emergency  Exercise as directed: You may need to see a physical therapist or do recommended exercises to improve movement and decrease your pain  You may be directed to walk, swim, or ride a bike  Follow your exercise plan exactly as directed to avoid further injury  Contact your healthcare provider if:   · You have questions or concerns about your condition or care  Return to the emergency department if:   · Your pain is worse, even after treatment  · You cannot bend or straighten your leg completely  · The swelling around your knee does not go down even with treatment  · Your knee is painful and hot to the touch  © 2017 [address] Information is for End User's use only and may not be sold, redistributed or otherwise used for commercial purposes  All illustrations and images included in CareNotes® are the copyrighted property of A D [employer] M [employer]  or [employer]  The above information is an  only  It is not intended as medical advice for individual conditions or treatments  Talk to your doctor, nurse or pharmacist before following any medical regimen to see if it is safe and effective for you  Swollen Knee Joint   WHAT YOU NEED TO KNOW:   A swollen knee joint may be caused by arthritis or by an injury or trauma, such as a knee sprain  It may also happen if you exercise too much  It may be painful to bend or straighten your knee, or walk  DISCHARGE INSTRUCTIONS:   Return to the emergency department if:   · Your knee locks or gives way  This may cause you to fall  · Your feet or toes start to look pale or feel cold      · You cannot bear weight on your leg, or you have severe pain even after treatment  Contact your healthcare provider if:   · You have a fever  · You have redness or warmth over your knee  · The swelling does not decrease with treatment  · It gets harder or more painful to straighten your leg at the knee  · Your knee weakens, or you continue to limp  · You have questions or concerns about your condition or care  Medicines:  · NSAIDs , such as ibuprofen, help decrease swelling, pain, and fever  This medicine is available with or without a doctor's order  NSAIDs can cause stomach bleeding or kidney problems in certain people  If you take blood thinner medicine, always ask your healthcare provider if NSAIDs are safe for you  Always read the medicine label and follow directions  · Take your medicine as directed  Contact your healthcare provider if you think your medicine is not helping or if you have side effects  Tell him of her if you are allergic to any medicine  Keep a list of the medicines, vitamins, and herbs you take  Include the amounts, and when and why you take them  Bring the list or the pill bottles to follow-up visits  Carry your medicine list with you in case of an emergency  Self-care:   · Rest  your knee  Avoid activities that make the swelling or pain worse  You may need to avoid putting weight on your knee while you have pain  Crutches, a cane, or a walker can be used to avoid putting weight on your knee while it heals  · Apply ice  on your knee for 15 to 20 minutes every hour or as directed  Use an ice pack, or put crushed ice in a plastic bag  Cover it with a towel  Ice helps prevent tissue damage and decreases swelling and pain  · Compress your knee with a brace or bandage to help reduce swelling  Use a brace or bandage only as directed  · Elevate  your knee above the level of your heart as often as you can  This will help decrease swelling and pain  Prop your joint on pillows or blankets to keep it elevated comfortably       · Apply heat on your knee for 20 to 30 minutes every 2 hours for as many days as directed  Heat helps decrease pain  Physical therapy:  A physical therapist teaches you exercises to help improve movement and strength, and to decrease pain  Follow up with your healthcare provider as directed:  Write down your questions so you remember to ask them during your visits  © 2017 [address] Information is for End User's use only and may not be sold, redistributed or otherwise used for commercial purposes  All illustrations and images included in CareNotes® are the copyrighted property of A D [employer] M [employer]  or [employer]  The above information is an  only  It is not intended as medical advice for individual conditions or treatments  Talk to your doctor, nurse or pharmacist before following any medical regimen to see if it is safe and effective for you  Knee Immobilizer   WHAT YOU NEED TO KNOW:   A knee immobilizer limits knee movement  It is used after an injury or surgery to help your knee, muscles, or tendons heal    DISCHARGE INSTRUCTIONS:   How to safely use a knee immobilizer:   · Have your knee immobilizer fitted by your healthcare provider  It is important that your knee immobilizer is the right size for you and that it fits properly  · Wear your knee immobilizer as directed  It can be worn over your clothing  Check the fit of the knee immobilizer often  If it does not fit properly or slips out of place, it could cause further injury  · Use crutches as directed  You may need to avoid putting weight on your injured leg  Your healthcare provider will tell you if you need crutches and for how long  · Inspect your knee immobilizer often  Do not wear your knee immobilizer if it is damaged or broken  You may need to replace it if it becomes worn  · Ask your healthcare provider how to care for your knee immobilizer    You may be able to hand wash the fabric with mild soap and water  Do not place it in the washer or dryer  · Go to physical therapy as directed  A physical therapist can help you strengthen the muscles in your leg and help your knee heal   Contact your healthcare provider if:   · Your knee pain becomes worse when you wear your knee immobilizer  · Your skin is sore or raw after you wear your knee immobilizer  · Your leg feels numb or swells while you wear your knee immobilizer  · Your knee immobilizer is damaged  · You have questions or concerns about your condition or care  Return to the emergency department if:   · You have severe swelling or pain in your leg or knee  © 2017 [address] [facility] Information is for End User's use only and may not be sold, redistributed or otherwise used for commercial purposes  All illustrations and images included in CareNotes® are the copyrighted property of A [employer]  or [employer] Rosas  The above information is an  only  It is not intended as medical advice for individual conditions or treatments  Talk to your doctor, nurse or pharmacist before following any medical regimen to see if it is safe and effective for you

## 2018-04-12 NOTE — ED NOTES
Attempted IV insertion  Able to get blood work, unable to keep IV  Provider notified        Kelley Nuñez RN  04/12/18 4096

## 2018-04-12 NOTE — ED NOTES
Patient presents to ED c/o of left knee and ankle swelling x1 week  Patient states he has a history of gout, and last winter had clear fluid drained from his right knee  Left knee has limited ROM  Swelling  Left ankle on left side of ankle has swelling and brown discoloration  Patient rates his pain 10/10   States I am only really here for a work note I don't want to have to stay in the hospital      Lavern Pena RN  04/12/18 0769

## 2018-04-12 NOTE — ED PROVIDER NOTES
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  History provided by:  Patient and significant other   used: No    Knee Pain   Location:  Knee  Time since incident:  5 days  Injury: no    Knee location:  L knee  Pain details:     Quality:  Aching and throbbing    Radiates to:  Does not radiate    Severity:  Moderate    Onset quality:  Gradual    Timing:  Constant    Progression:  Waxing and waning  Chronicity:  New  Dislocation: no    Prior injury to area:  No  Relieved by:  None tried  Worsened by:  Bearing weight and activity  Ineffective treatments:  None tried  Associated symptoms: decreased ROM and swelling    Associated symptoms: no back pain, no fatigue, no fever, no muscle weakness, no neck pain, no numbness, no stiffness and no tingling    Risk factors: no concern for non-accidental trauma and no obesity        Prior to Admission Medications   Prescriptions Last Dose Informant Patient Reported? Taking?    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, left wrist        Past Surgical History:   Procedure Laterality Date    HAND FRACTURE REPAIR      KNEE CARTILAGE SURGERY         Family History   Problem Relation Age of Onset    Family history unknown: Yes     I have reviewed and agree with the history as documented  Social History   Substance Use Topics    Smoking status: Current Every Day Smoker     Packs/day: 1 00     Years: 40 00    Smokeless tobacco: Never Used    Alcohol use 4 2 oz/week     3 Cans of beer, 4 Shots of liquor per week      Comment: drinks 2 - 3 times /week        Review of Systems   Constitutional: Negative for chills, diaphoresis, fatigue and fever  Musculoskeletal: Positive for joint swelling  Negative for back pain, neck pain and stiffness  Skin: Negative for color change and wound  Neurological: Negative for weakness and numbness  All other systems reviewed and are negative  Physical Exam  ED Triage Vitals [04/12/18 1241]   Temperature Pulse Respirations Blood Pressure SpO2   98 1 °F (36 7 °C) 84 20 (!) 176/108 99 %      Temp Source Heart Rate Source Patient Position - Orthostatic VS BP Location FiO2 (%)   Temporal Monitor Sitting Left arm --      Pain Score       Worst Possible Pain           Orthostatic Vital Signs  Vitals:    04/12/18 1241   BP: (!) 176/108   Pulse: 84   Patient Position - Orthostatic VS: Sitting       Physical Exam   Constitutional: He appears well-developed and well-nourished  HENT:   Nose: Nose normal    Eyes: Conjunctivae are normal    Neck: Neck supple  Cardiovascular: Normal rate  Pulmonary/Chest: Effort normal    Musculoskeletal:        Left knee: He exhibits decreased range of motion and effusion  He exhibits no deformity, no laceration, normal alignment and normal patellar mobility  Tenderness found  Medial joint line and lateral joint line tenderness noted  No MCL, no LCL and no patellar tendon tenderness noted  Neurological: He is alert  Skin: Skin is warm  Psychiatric: He has a normal mood and affect  Nursing note and vitals reviewed        ED Medications  Medications   ketorolac (TORADOL) injection 60 mg (60 mg Intramuscular Given 4/12/18 8496)       Diagnostic Studies  Results Reviewed     Procedure Component Value Units Date/Time    Basic metabolic panel [[id number]]  (Abnormal) Collected:  04/12/18 1303    Lab Status:  Final result Specimen:  Blood from Arm, Left Updated:  04/12/18 1329     Sodium 137 mmol/L      Potassium 4 2 mmol/L      Chloride 99 (L) mmol/L      CO2 30 mmol/L      Anion Gap 8 mmol/L      BUN 10 mg/dL      Creatinine 0 62 mg/dL      Glucose 101 mg/dL      Calcium 8 5 mg/dL      eGFR 112 ml/min/1 73sq m     Narrative:         National Kidney Disease Education Program recommendations are as follows:  GFR calculation is accurate only with a steady state creatinine  Chronic Kidney disease less than 60 ml/min/1 73 sq  meters  Kidney failure less than 15 ml/min/1 73 sq  meters  Uric acid [[id number]]  (Normal) Collected:  04/12/18 1303    Lab Status:  Final result Specimen:  Blood from Arm, Left Updated:  04/12/18 1329     Uric Acid 6 7 mg/dL     CBC and differential [[id number]]  (Abnormal) Collected:  04/12/18 1303    Lab Status:  Final result Specimen:  Blood from Arm, Left Updated:  04/12/18 1315     WBC 7 16 Thousand/uL      RBC 4 13 Million/uL      Hemoglobin 13 0 g/dL      Hematocrit 38 9 %      MCV 94 fL      MCH 31 5 pg      MCHC 33 4 g/dL      RDW 13 3 %      MPV 9 6 fL      Platelets 595 Thousands/uL      Neutrophils Relative 54 %      Lymphocytes Relative 31 %      Monocytes Relative 13 (H) %      Eosinophils Relative 2 %      Basophils Relative 0 %      Neutrophils Absolute 3 87 Thousands/µL      Lymphocytes Absolute 2 21 Thousands/µL      Monocytes Absolute 0 90 Thousand/µL      Eosinophils Absolute 0 16 Thousand/µL      Basophils Absolute 0 02 Thousands/µL                  XR knee 3 views left non injury   ED Interpretation by Stephanie Rodriguez DO (04/12 1313)   Effusion, degen changes      Final Result by Cordelia Lew DO (04/12 1411)   Degenerative changes with suprapatellar joint effusion and soft tissue swelling in the popliteal fossa  No acute osseous abnormality  Workstation performed: [id number]         XR foot 3+ views LEFT   ED Interpretation by Kimber Mitchell DO (04/12 1313)   neg      Final Result by Alma Escamilla DO (04/12 1410)      No acute osseous abnormality  Workstation performed: [id number]                    Procedures  Procedures       Phone Contacts  ED Phone Contact    ED Course  ED Course                                MDM  Number of Diagnoses or Management Options  Effusion of left knee: new and requires workup  Left foot pain: new and requires workup  Left knee pain: new and requires workup     Amount and/or Complexity of Data Reviewed  Clinical lab tests: ordered and reviewed  Tests in the radiology section of CPT®: reviewed and ordered  Obtain history from someone other than the patient: yes  Independent visualization of images, tracings, or specimens: yes      CritCare Time    Disposition  Final diagnoses:   Left knee pain   Effusion of left knee   Left foot pain     Time reflects when diagnosis was documented in both MDM as applicable and the Disposition within this note     Time User Action Codes Description Comment    4/12/2018  1:39 PM Mohamud DURHAM Add [M25 562] Left knee pain     4/12/2018  1:39 PM Lakshmi Mcclain Add [M25 462] Effusion of left knee     4/12/2018  1:39 PM Anil Santana Add [K92 195] Left foot pain       ED Disposition     ED Disposition Condition Comment    Discharge  [place] Stakaz discharge to home/self care      Condition at discharge: Good        Follow-up Information     Follow up With Specialties Details Why Abby Geuro  Call If symptoms worsen [address]  [phone]        Discharge Medication List as of 4/12/2018  1:42 PM      START taking these medications    Details   indomethacin (INDOCIN) 50 mg capsule Take 1 capsule (50 mg total) by mouth 3 (three) times a day as needed for moderate pain (take with food), Starting Thu 4/12/2018, Print         CONTINUE these medications which have NOT CHANGED    Details   buprenorphine-naloxone (SUBOXONE) 8-2 mg per SL tablet Place 1 tablet under the tongue 2 (two) times a day, Historical Med           No discharge procedures on file      ED Provider  Electronically Signed by           Drew Longo DO  04/12/18 5522

## 2020-02-08 ENCOUNTER — HOSPITAL ENCOUNTER (EMERGENCY)
Facility: HOSPITAL | Age: 55
Discharge: HOME | End: 2020-02-08
Attending: EMERGENCY MEDICINE | Admitting: EMERGENCY MEDICINE

## 2020-02-08 ENCOUNTER — APPOINTMENT (EMERGENCY)
Dept: RADIOLOGY | Facility: HOSPITAL | Age: 55
End: 2020-02-08

## 2020-02-08 VITALS
RESPIRATION RATE: 18 BRPM | HEIGHT: 72 IN | BODY MASS INDEX: 17.65 KG/M2 | OXYGEN SATURATION: 99 % | TEMPERATURE: 97.6 F | SYSTOLIC BLOOD PRESSURE: 180 MMHG | DIASTOLIC BLOOD PRESSURE: 107 MMHG | HEART RATE: 82 BPM | WEIGHT: 130.3 LBS

## 2020-02-08 DIAGNOSIS — M25.532 PAIN AND SWELLING OF LEFT WRIST: Primary | ICD-10-CM

## 2020-02-08 DIAGNOSIS — M25.432 PAIN AND SWELLING OF LEFT WRIST: Primary | ICD-10-CM

## 2020-02-08 PROCEDURE — 73130 X-RAY EXAM OF HAND: CPT

## 2020-02-08 PROCEDURE — 73110 X-RAY EXAM OF WRIST: CPT

## 2020-02-08 PROCEDURE — 99283 EMERGENCY DEPT VISIT LOW MDM: CPT

## 2020-02-08 PROCEDURE — 99284 EMERGENCY DEPT VISIT MOD MDM: CPT | Performed by: EMERGENCY MEDICINE

## 2020-02-08 RX ORDER — PREDNISONE 20 MG/1
60 TABLET ORAL DAILY
Qty: 15 TABLET | Refills: 0 | Status: SHIPPED | OUTPATIENT
Start: 2020-02-08 | End: 2020-02-13

## 2020-02-08 NOTE — ED PROVIDER NOTES
History  Chief Complaint   Patient presents with    Wrist Pain     patient reports left wrist pain started friday night  worse today  denies injury or trauma  admits to repetative motion of wrist due to working with papers and binding  reports limited rom, swelling, and pain      63 yom, repetitive motion wrist injury  Minimal swelling  NVI  Xray to r/o fracture  nsaids      Wrist Pain   Location:  Left  Quality:  Aching  Severity:  Mild  Onset quality:  Gradual  Duration:  2 days  Timing:  Intermittent  Progression:  Waxing and waning  Chronicity:  New  Relieved by:  None  Worsened by: Movement  Ineffective treatments:  None  Associated symptoms: no chest pain, no cough, no diarrhea, no fatigue, no fever, no headaches, no nausea, no rash, no shortness of breath and no vomiting  Ear pain: repetitive motion  Prior to Admission Medications   Prescriptions Last Dose Informant Patient Reported? Taking? buprenorphine-naloxone (SUBOXONE) 8-2 mg per SL tablet   Yes No   Sig: Place 1 tablet under the tongue 2 (two) times a day   indomethacin (INDOCIN) 50 mg capsule   No No   Sig: Take 1 capsule (50 mg total) by mouth 3 (three) times a day as needed for moderate pain (take with food)      Facility-Administered Medications: None       Past Medical History:   Diagnosis Date    Drug abuse ([facility] [address] )     Gout due to renal impairment, left wrist        Past Surgical History:   Procedure Laterality Date    CARPAL TUNNEL RELEASE      HAND FRACTURE REPAIR      KNEE CARTILAGE SURGERY         Family History   Family history unknown: Yes     I have reviewed and agree with the history as documented  Social History     Tobacco Use    Smoking status: Current Every Day Smoker     Packs/day: 1 00     Years: 40 00     Pack years: 40 00    Smokeless tobacco: Never Used   Substance Use Topics    Alcohol use:  Yes     Alcohol/week: 7 0 standard drinks     Types: 3 Cans of beer, 4 Shots of liquor per week     Comment: drinks 2 - 3 times /week    Drug use: No     Comment: FORMER        Review of Systems   Constitutional: Negative for chills, fatigue and fever  HENT: Ear pain: repetitive motion  Eyes: Negative for photophobia and visual disturbance  Respiratory: Negative for cough and shortness of breath  Cardiovascular: Negative for chest pain, palpitations and leg swelling  Gastrointestinal: Negative for diarrhea, nausea and vomiting  Endocrine: Negative for polydipsia and polyuria  Genitourinary: Negative for decreased urine volume, difficulty urinating, dysuria and frequency  Musculoskeletal: Negative for back pain, neck pain and neck stiffness  Skin: Negative for color change and rash  Allergic/Immunologic: Negative for environmental allergies and immunocompromised state  Neurological: Negative for dizziness and headaches  Hematological: Negative for adenopathy  Does not bruise/bleed easily  Psychiatric/Behavioral: Negative for dysphoric mood  The patient is not nervous/anxious  Physical Exam  Physical Exam   Constitutional: He is oriented to person, place, and time  He appears well-developed  HENT:   Head: Normocephalic and atraumatic  Right Ear: External ear normal    Left Ear: External ear normal    Mouth/Throat: Oropharynx is clear and moist    Eyes: Pupils are equal, round, and reactive to light  Conjunctivae and EOM are normal    Neck: Normal range of motion  Neck supple  No JVD present  No thyromegaly present  Cardiovascular: Normal rate, regular rhythm and normal heart sounds  Exam reveals no gallop and no friction rub  No murmur heard  Pulmonary/Chest: Effort normal and breath sounds normal  No respiratory distress  He has no wheezes  He has no rales  Abdominal: Soft  Bowel sounds are normal  He exhibits no distension  There is no rebound and no guarding  Musculoskeletal: Normal range of motion  He exhibits no edema  Lymphadenopathy:     He has no cervical adenopathy  Neurological: He is alert and oriented to person, place, and time  No cranial nerve deficit  Skin: Skin is warm  Psychiatric: He has a normal mood and affect  His behavior is normal    Nursing note and vitals reviewed  Vital Signs  ED Triage Vitals [02/08/20 1130]   Temperature Pulse Respirations Blood Pressure SpO2   97 6 °F (36 4 °C) 82 18 (!) 180/107 99 %      Temp Source Heart Rate Source Patient Position - Orthostatic VS BP Location FiO2 (%)   Tympanic Monitor Sitting Right arm --      Pain Score       9           Vitals:    02/08/20 1130   BP: (!) 180/107   Pulse: 82   Patient Position - Orthostatic VS: Sitting         Visual Acuity      ED Medications  Medications - No data to display    Diagnostic Studies  Results Reviewed     None                 XR wrist 3+ views LEFT   Final Result by Niesha Muniz MD (02/08 1255)      No acute osseous abnormality  Postsurgical changes of the 2nd through 4th digit proximal phalanges  There is mild backing out of the 3 fixation screws of the 3rd digit fixation device  Degenerative changes as described  The study was marked in EPIC for significant notification  Workstation performed: [id number]         XR hand 3+ views LEFT   Final Result by Niesha Muniz MD (02/08 1255)      No acute osseous abnormality  Postsurgical changes of the 2nd through 4th digit proximal phalanges  There is mild backing out of the 3 fixation screws of the 3rd digit fixation device  Degenerative changes as described  The study was marked in EPIC for significant notification        Workstation performed: [id number]                    Procedures  Procedures         ED Course                               MDM  Number of Diagnoses or Management Options  Pain and swelling of left wrist: new and requires workup     Amount and/or Complexity of Data Reviewed  Tests in the radiology section of CPT®: reviewed and ordered  Independent visualization of images, tracings, or specimens: yes          Disposition  Final diagnoses:   Pain and swelling of left wrist     Time reflects when diagnosis was documented in both MDM as applicable and the Disposition within this note     Time User Action Codes Description Comment    2/8/2020 12:27 PM Tara Civil Add [T13 621,  M25 432] Pain and swelling of left wrist       ED Disposition     ED Disposition Condition Date/Time Comment    Discharge Stable Sat Feb 8, 2020 12:26 PM Marin Nunn discharge to home/self care  Follow-up Information     Follow up With Specialties Details Why [address], MD Orthopedic Surgery, Hand Surgery   [address] [address]  [address] [facility] [address] [phone]            Discharge Medication List as of 2/8/2020 12:30 PM      START taking these medications    Details   diclofenac sodium (VOLTAREN) 1 % Apply 2 g topically 4 (four) times a day, Starting Sat 2/8/2020, Print      predniSONE 20 mg tablet Take 3 tablets (60 mg total) by mouth daily for 5 days, Starting Sat 2/8/2020, Until Thu 2/13/2020, Print         CONTINUE these medications which have NOT CHANGED    Details   buprenorphine-naloxone (SUBOXONE) 8-2 mg per SL tablet Place 1 tablet under the tongue 2 (two) times a day, Historical Med      indomethacin (INDOCIN) 50 mg capsule Take 1 capsule (50 mg total) by mouth 3 (three) times a day as needed for moderate pain (take with food), Starting Thu 4/12/2018, Print           No discharge procedures on file      ED Provider  Electronically Signed by           Sebastian Aparicio DO  02/16/20 8717

## 2021-07-16 ENCOUNTER — APPOINTMENT (EMERGENCY)
Dept: RADIOLOGY | Facility: HOSPITAL | Age: 56
End: 2021-07-16
Payer: COMMERCIAL

## 2021-07-16 ENCOUNTER — APPOINTMENT (EMERGENCY)
Dept: CT IMAGING | Facility: HOSPITAL | Age: 56
End: 2021-07-16
Payer: COMMERCIAL

## 2021-07-16 ENCOUNTER — HOSPITAL ENCOUNTER (EMERGENCY)
Facility: HOSPITAL | Age: 56
Discharge: HOME | End: 2021-07-16
Attending: EMERGENCY MEDICINE | Admitting: EMERGENCY MEDICINE
Payer: COMMERCIAL

## 2021-07-16 VITALS
TEMPERATURE: 98 F | DIASTOLIC BLOOD PRESSURE: 89 MMHG | SYSTOLIC BLOOD PRESSURE: 141 MMHG | WEIGHT: 125 LBS | BODY MASS INDEX: 16.93 KG/M2 | HEIGHT: 72 IN | RESPIRATION RATE: 18 BRPM | OXYGEN SATURATION: 99 % | HEART RATE: 67 BPM

## 2021-07-16 DIAGNOSIS — C10.9 OROPHARYNGEAL CANCER (HCC): Primary | ICD-10-CM

## 2021-07-16 LAB
ALBUMIN SERPL BCP-MCNC: 3.2 G/DL (ref 3.5–5)
ALP SERPL-CCNC: 64 U/L (ref 46–116)
ALT SERPL W P-5'-P-CCNC: 77 U/L (ref 12–78)
ANION GAP SERPL CALCULATED.3IONS-SCNC: 7 MMOL/L (ref 4–13)
AST SERPL W P-5'-P-CCNC: 32 U/L (ref 5–45)
ATRIAL RATE: 78 BPM
BASOPHILS # BLD AUTO: 0.04 THOUSANDS/ΜL (ref 0–0.1)
BASOPHILS NFR BLD AUTO: 0 % (ref 0–1)
BILIRUB SERPL-MCNC: 0.5 MG/DL (ref 0.2–1)
BUN SERPL-MCNC: 23 MG/DL (ref 5–25)
CALCIUM ALBUM COR SERPL-MCNC: 9.8 MG/DL (ref 8.3–10.1)
CALCIUM SERPL-MCNC: 9.2 MG/DL (ref 8.3–10.1)
CHLORIDE SERPL-SCNC: 98 MMOL/L (ref 100–108)
CO2 SERPL-SCNC: 28 MMOL/L (ref 21–32)
CREAT SERPL-MCNC: 1.02 MG/DL (ref 0.6–1.3)
EOSINOPHIL # BLD AUTO: 0.09 THOUSAND/ΜL (ref 0–0.61)
EOSINOPHIL NFR BLD AUTO: 1 % (ref 0–6)
ERYTHROCYTE [DISTWIDTH] IN BLOOD BY AUTOMATED COUNT: 13.2 % (ref 11.6–15.1)
GFR SERPL CREATININE-BSD FRML MDRD: 81 ML/MIN/1.73SQ M
GLUCOSE SERPL-MCNC: 106 MG/DL (ref 65–140)
HCT VFR BLD AUTO: 32.4 % (ref 36.5–49.3)
HGB BLD-MCNC: 10.5 G/DL (ref 12–17)
IMM GRANULOCYTES # BLD AUTO: 0.06 THOUSAND/UL (ref 0–0.2)
IMM GRANULOCYTES NFR BLD AUTO: 1 % (ref 0–2)
LYMPHOCYTES # BLD AUTO: 1.63 THOUSANDS/ΜL (ref 0.6–4.47)
LYMPHOCYTES NFR BLD AUTO: 13 % (ref 14–44)
MCH RBC QN AUTO: 31.4 PG (ref 26.8–34.3)
MCHC RBC AUTO-ENTMCNC: 32.4 G/DL (ref 31.4–37.4)
MCV RBC AUTO: 97 FL (ref 82–98)
MONOCYTES # BLD AUTO: 1.1 THOUSAND/ΜL (ref 0.17–1.22)
MONOCYTES NFR BLD AUTO: 9 % (ref 4–12)
NEUTROPHILS # BLD AUTO: 9.99 THOUSANDS/ΜL (ref 1.85–7.62)
NEUTS SEG NFR BLD AUTO: 76 % (ref 43–75)
NRBC BLD AUTO-RTO: 0 /100 WBCS
P AXIS: 70 DEGREES
PLATELET # BLD AUTO: 307 THOUSANDS/UL (ref 149–390)
PMV BLD AUTO: 9.5 FL (ref 8.9–12.7)
POTASSIUM SERPL-SCNC: 4 MMOL/L (ref 3.5–5.3)
PR INTERVAL: 118 MS
PROT SERPL-MCNC: 8.4 G/DL (ref 6.4–8.2)
QRS AXIS: 87 DEGREES
QRSD INTERVAL: 86 MS
QT INTERVAL: 382 MS
QTC INTERVAL: 435 MS
RBC # BLD AUTO: 3.34 MILLION/UL (ref 3.88–5.62)
SODIUM SERPL-SCNC: 133 MMOL/L (ref 136–145)
T WAVE AXIS: 65 DEGREES
TROPONIN I SERPL-MCNC: <0.02 NG/ML
VENTRICULAR RATE: 78 BPM
WBC # BLD AUTO: 12.91 THOUSAND/UL (ref 4.31–10.16)

## 2021-07-16 PROCEDURE — 99285 EMERGENCY DEPT VISIT HI MDM: CPT

## 2021-07-16 PROCEDURE — G1004 CDSM NDSC: HCPCS

## 2021-07-16 PROCEDURE — 70491 CT SOFT TISSUE NECK W/DYE: CPT

## 2021-07-16 PROCEDURE — 85025 COMPLETE CBC W/AUTO DIFF WBC: CPT

## 2021-07-16 PROCEDURE — 36415 COLL VENOUS BLD VENIPUNCTURE: CPT

## 2021-07-16 PROCEDURE — 99284 EMERGENCY DEPT VISIT MOD MDM: CPT | Performed by: PHYSICIAN ASSISTANT

## 2021-07-16 PROCEDURE — 80053 COMPREHEN METABOLIC PANEL: CPT

## 2021-07-16 PROCEDURE — 93005 ELECTROCARDIOGRAM TRACING: CPT

## 2021-07-16 PROCEDURE — 93010 ELECTROCARDIOGRAM REPORT: CPT | Performed by: INTERNAL MEDICINE

## 2021-07-16 PROCEDURE — 71046 X-RAY EXAM CHEST 2 VIEWS: CPT

## 2021-07-16 PROCEDURE — 84484 ASSAY OF TROPONIN QUANT: CPT

## 2021-07-16 RX ADMIN — IOHEXOL 85 ML: 350 INJECTION, SOLUTION INTRAVENOUS at 11:14

## 2021-07-16 NOTE — DISCHARGE INSTRUCTIONS
Follow up with ENT as directed   If at any time you have difficulty breathing or swallowing, return to the Emergency Department

## 2021-07-16 NOTE — ED PROVIDER NOTES
History  Chief Complaint   Patient presents with    Shortness of Breath     Patient states that he has been having intermittent sob over the past month  positive productive cough  Nothing makes the sob worse or better     62 yo male w/ hx of polysubstance abuse presents to the Emergency Department from [facility] for evaluation of shortness of breath  Reports PND and occasional choking x 1 month with productive cough  He is a smoker  Denies chew tobacco  Was apparently evaluated for this recently at [facility] and Dx w/ Bell's Palsy; pt noted to have near complete R facial paralysis  + night sweats and weight loss  No chest pain, N/V or leg swelling  Prior to Admission Medications   Prescriptions Last Dose Informant Patient Reported? Taking? buprenorphine-naloxone (SUBOXONE) 8-2 mg per SL tablet   Yes No   Sig: Place 1 tablet under the tongue 2 (two) times a day   diclofenac sodium (VOLTAREN) 1 %   No No   Sig: Apply 2 g topically 4 (four) times a day   indomethacin (INDOCIN) 50 mg capsule Not Taking at Unknown time  No No   Sig: Take 1 capsule (50 mg total) by mouth 3 (three) times a day as needed for moderate pain (take with food)   Patient not taking: Reported on 7/16/2021      Facility-Administered Medications: None       Past Medical History:   Diagnosis Date    Drug abuse ([facility] [address] )     Gout due to renal impairment, left wrist        Past Surgical History:   Procedure Laterality Date    CARPAL TUNNEL RELEASE      HAND FRACTURE REPAIR      KNEE CARTILAGE SURGERY         Family History   Family history unknown: Yes     I have reviewed and agree with the history as documented  E-Cigarette/Vaping     E-Cigarette/Vaping Substances     Social History     Tobacco Use    Smoking status: Current Every Day Smoker     Packs/day: 1 00     Years: 40 00     Pack years: 40 00    Smokeless tobacco: Never Used   Substance Use Topics    Alcohol use:  Yes     Alcohol/week: 7 0 standard drinks     Types: 3 Cans of beer, 4 Shots of liquor per week     Comment: drinks 2 - 3 times /week    Drug use: No     Comment: FORMER       Review of Systems   Constitutional: Positive for diaphoresis and unexpected weight change  Negative for chills and fever  HENT: Positive for trouble swallowing and voice change  Eyes: Negative for visual disturbance  Respiratory: Positive for shortness of breath  Negative for cough  Cardiovascular: Negative for chest pain and palpitations  Gastrointestinal: Negative for abdominal pain, diarrhea, nausea and vomiting  Genitourinary: Negative for dysuria, flank pain and frequency  Musculoskeletal: Negative for arthralgias and myalgias  Skin: Negative for color change, rash and wound  Allergic/Immunologic: Negative for immunocompromised state  Neurological: Positive for facial asymmetry  Negative for dizziness and light-headedness  Hematological: Does not bruise/bleed easily  Psychiatric/Behavioral: Negative for confusion  The patient is not nervous/anxious  Physical Exam  Physical Exam  Vitals and nursing note reviewed  Constitutional:       Appearance: He is well-developed and underweight  He is not ill-appearing, toxic-appearing or diaphoretic  HENT:      Head: Normocephalic and atraumatic  Eyes:      Conjunctiva/sclera: Conjunctivae normal    Cardiovascular:      Rate and Rhythm: Normal rate and regular rhythm  Heart sounds: No murmur heard  Pulmonary:      Effort: Pulmonary effort is normal  No respiratory distress  Breath sounds: Normal breath sounds  Abdominal:      Palpations: Abdomen is soft  Tenderness: There is no abdominal tenderness  Musculoskeletal:      Cervical back: Neck supple  Right lower leg: No edema  Left lower leg: No edema  Skin:     General: Skin is warm and dry  Capillary Refill: Capillary refill takes less than 2 seconds     Neurological:      Mental Status: He is alert and oriented to person, place, and time  Cranial Nerves: Cranial nerve deficit (R facial paralysis, includes forehead) present     Psychiatric:         Mood and Affect: Mood normal          Behavior: Behavior normal          Vital Signs  ED Triage Vitals   Temperature Pulse Respirations Blood Pressure SpO2   07/16/21 1033 07/16/21 1033 07/16/21 1033 07/16/21 1033 07/16/21 1033   98 °F (36 7 °C) 82 17 145/88 99 %      Temp src Heart Rate Source Patient Position - Orthostatic VS BP Location FiO2 (%)   -- 07/16/21 1230 07/16/21 1033 07/16/21 1033 --    Monitor Lying Right arm       Pain Score       --                  Vitals:    07/16/21 1033 07/16/21 1230   BP: 145/88 141/89   Pulse: 82 67   Patient Position - Orthostatic VS: Lying Lying         Visual Acuity      ED Medications  Medications   iohexol (OMNIPAQUE) 350 MG/ML injection (SINGLE-DOSE) 100 mL (85 mL Intravenous Given 7/16/21 1114)       Diagnostic Studies  Results Reviewed     Procedure Component Value Units Date/Time    Troponin I [[id number]]  (Normal) Collected: 07/16/21 1036    Lab Status: Final result Specimen: Blood from Arm, Right Updated: 07/16/21 1102     Troponin I <0 02 ng/mL     Comprehensive metabolic panel [[id number]]  (Abnormal) Collected: 07/16/21 1036    Lab Status: Final result Specimen: Blood from Arm, Right Updated: 07/16/21 1100     Sodium 133 mmol/L      Potassium 4 0 mmol/L      Chloride 98 mmol/L      CO2 28 mmol/L      ANION GAP 7 mmol/L      BUN 23 mg/dL      Creatinine 1 02 mg/dL      Glucose 106 mg/dL      Calcium 9 2 mg/dL      Corrected Calcium 9 8 mg/dL      AST 32 U/L      ALT 77 U/L      Alkaline Phosphatase 64 U/L      Total Protein 8 4 g/dL      Albumin 3 2 g/dL      Total Bilirubin 0 50 mg/dL      eGFR 81 ml/min/1 73sq m     Narrative:      Leigh guidelines for Chronic Kidney Disease (CKD):     Stage 1 with normal or high GFR (GFR > 90 mL/min/1 73 square meters)    Stage 2 Mild CKD (GFR = 60-89 mL/min/1 73 square meters)    Stage 3A Moderate CKD (GFR = 45-59 mL/min/1 73 square meters)    Stage 3B Moderate CKD (GFR = 30-44 mL/min/1 73 square meters)    Stage 4 Severe CKD (GFR = 15-29 mL/min/1 73 square meters)    Stage 5 End Stage CKD (GFR <15 mL/min/1 73 square meters)  Note: GFR calculation is accurate only with a steady state creatinine    CBC and differential [[id number]]  (Abnormal) Collected: 07/16/21 1036    Lab Status: Final result Specimen: Blood from Arm, Right Updated: 07/16/21 1045     WBC 12 91 Thousand/uL      RBC 3 34 Million/uL      Hemoglobin 10 5 g/dL      Hematocrit 32 4 %      MCV 97 fL      MCH 31 4 pg      MCHC 32 4 g/dL      RDW 13 2 %      MPV 9 5 fL      Platelets 422 Thousands/uL      nRBC 0 /100 WBCs      Neutrophils Relative 76 %      Immat GRANS % 1 %      Lymphocytes Relative 13 %      Monocytes Relative 9 %      Eosinophils Relative 1 %      Basophils Relative 0 %      Neutrophils Absolute 9 99 Thousands/µL      Immature Grans Absolute 0 06 Thousand/uL      Lymphocytes Absolute 1 63 Thousands/µL      Monocytes Absolute 1 10 Thousand/µL      Eosinophils Absolute 0 09 Thousand/µL      Basophils Absolute 0 04 Thousands/µL                  CT soft tissue neck with contrast   Final Result by Teresa Winslow MD (07/16 1242)      Large mass centered in the right floor of mouth/tongue and oropharynx suspicious for squamous cell cancer with parapharyngeal extension and 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  Invasion of the right right carotid space with encasement of the right    internal carotid artery without luminal narrowing and occlusion of the high cervical right IJ  Tumor extends the skull base where there is localized invasion of the right petrous bone  Suggest follow-up contrast enhanced MRI of the brain and skull base    to assess extent of disease   Right transverse and sigmoid sinuses are not well seen and may be hypoplastic as there appears to be dominant left-sided drainage however chronic occlusion is also possible  No evidence of acute sinus thrombosis  Bilateral upper lobe lung nodules, right greater than left  Some of these are cavitary and concerning for metastatic disease  Additional groundglass nodules in the right upper lobe may be infectious/inflammatory  Recommend chest CT for further    evaluation  I personally discussed this study with Jason Pérez on 7/16/2021 at 12:45 PM                Workstation performed: [id number]         XR chest pa & lateral    (Results Pending)              Procedures  Procedures         ED Course                             SBIRT 20yo+      Most Recent Value   SBIRT (25 yo +)   In order to provide better care to our patients, we are screening all of our patients for alcohol and drug use  Would it be okay to ask you these screening questions? No Filed at: 07/16/2021 1103                    MDM  Number of Diagnoses or Management Options  Oropharyngeal cancer [facility]): new and requires workup  Diagnosis management comments:   Patient was informed of his extensive mass of the right oropharynx  His hoarseness and Bell's palsy are results of occlusion of the right internal jugular vein  Chest imaging is suggestive of metastatic disease  Patient was strongly encouraged follow-up as an outpatient with otolaryngology as his treatment compliance will determine his survive bili the from this  There is no immediate airway threat based on imaging    I did discuss the case with ENT on-call Dr Brandy Villagomez he is in agreement with outpatient follow-up plan       Amount and/or Complexity of Data Reviewed  Clinical lab tests: ordered and reviewed  Tests in the radiology section of CPT®: ordered and reviewed  Tests in the medicine section of CPT®: ordered and reviewed  Review and summarize past medical records: yes  Discuss the patient with other providers: yes  Independent visualization of images, tracings, or specimens: yes        Disposition  Final diagnoses: Oropharyngeal cancer ([facility] [address] )     Time reflects when diagnosis was documented in both MDM as applicable and the Disposition within this note     Time User Action Codes Description Comment    7/16/2021 12:59 PM Sandhya Batista Add [C10 9] Oropharyngeal cancer [facility])       ED Disposition     ED Disposition Condition Date/Time Comment    Discharge Stable Fri Jul 16, 2021 12:59 PM Lexy Briseno discharge to home/self care  Follow-up Information     Follow up With Specialties Details Why Contact Info Additional Information    Case Ear, Nose and Throat Otolaryngology Schedule an appointment as soon as possible for a visit   [address] [place] [address] [phone]  [facility], [address], [address] [address], [facility], [address]          Discharge Medication List as of 7/16/2021  1:01 PM      CONTINUE these medications which have NOT CHANGED    Details   buprenorphine-naloxone (SUBOXONE) 8-2 mg per SL tablet Place 1 tablet under the tongue 2 (two) times a day, Historical Med      diclofenac sodium (VOLTAREN) 1 % Apply 2 g topically 4 (four) times a day, Starting Sat 2/8/2020, Print      indomethacin (INDOCIN) 50 mg capsule Take 1 capsule (50 mg total) by mouth 3 (three) times a day as needed for moderate pain (take with food), Starting Thu 4/12/2018, Print           No discharge procedures on file      PDMP Review     None          ED Provider  Electronically Signed by           Adrienne Troy PA-C  07/16/21 9016

## 2021-07-31 ENCOUNTER — HOSPITAL ENCOUNTER (INPATIENT)
Facility: HOSPITAL | Age: 56
LOS: 3 days | DRG: 110 | End: 2021-08-04
Attending: EMERGENCY MEDICINE | Admitting: INTERNAL MEDICINE
Payer: COMMERCIAL

## 2021-07-31 ENCOUNTER — APPOINTMENT (EMERGENCY)
Dept: CT IMAGING | Facility: HOSPITAL | Age: 56
DRG: 110 | End: 2021-07-31
Payer: COMMERCIAL

## 2021-07-31 ENCOUNTER — APPOINTMENT (EMERGENCY)
Dept: RADIOLOGY | Facility: HOSPITAL | Age: 56
DRG: 110 | End: 2021-07-31
Payer: COMMERCIAL

## 2021-07-31 DIAGNOSIS — C79.9 METASTATIC DISEASE (HCC): ICD-10-CM

## 2021-07-31 DIAGNOSIS — E86.0 DEHYDRATION: ICD-10-CM

## 2021-07-31 DIAGNOSIS — C14.0 PHARYNGEAL CANCER (HCC): Primary | ICD-10-CM

## 2021-07-31 DIAGNOSIS — E87.6 HYPOKALEMIA: ICD-10-CM

## 2021-07-31 DIAGNOSIS — R91.8 PULMONARY INFILTRATES: ICD-10-CM

## 2021-07-31 LAB
ALBUMIN SERPL BCP-MCNC: 2.8 G/DL (ref 3.5–5)
ALP SERPL-CCNC: 66 U/L (ref 46–116)
ALT SERPL W P-5'-P-CCNC: 74 U/L (ref 12–78)
ANION GAP SERPL CALCULATED.3IONS-SCNC: 4 MMOL/L (ref 4–13)
ANISOCYTOSIS BLD QL SMEAR: PRESENT
AST SERPL W P-5'-P-CCNC: 31 U/L (ref 5–45)
BASO STIPL BLD QL SMEAR: PRESENT
BASOPHILS # BLD MANUAL: 0 THOUSAND/UL (ref 0–0.1)
BASOPHILS NFR MAR MANUAL: 0 % (ref 0–1)
BILIRUB SERPL-MCNC: 0.6 MG/DL (ref 0.2–1)
BUN SERPL-MCNC: 23 MG/DL (ref 5–25)
CALCIUM ALBUM COR SERPL-MCNC: 9.8 MG/DL (ref 8.3–10.1)
CALCIUM SERPL-MCNC: 8.8 MG/DL (ref 8.3–10.1)
CHLORIDE SERPL-SCNC: 97 MMOL/L (ref 100–108)
CO2 SERPL-SCNC: 32 MMOL/L (ref 21–32)
CREAT SERPL-MCNC: 0.96 MG/DL (ref 0.6–1.3)
EOSINOPHIL # BLD MANUAL: 0 THOUSAND/UL (ref 0–0.4)
EOSINOPHIL NFR BLD MANUAL: 0 % (ref 0–6)
ERYTHROCYTE [DISTWIDTH] IN BLOOD BY AUTOMATED COUNT: 12.7 % (ref 11.6–15.1)
GFR SERPL CREATININE-BSD FRML MDRD: 87 ML/MIN/1.73SQ M
GLUCOSE SERPL-MCNC: 150 MG/DL (ref 65–140)
HCT VFR BLD AUTO: 29.6 % (ref 36.5–49.3)
HGB BLD-MCNC: 10.1 G/DL (ref 12–17)
HYPERCHROMIA BLD QL SMEAR: PRESENT
LACTATE SERPL-SCNC: 1.3 MMOL/L (ref 0.5–2)
LYMPHOCYTES # BLD AUTO: 1.39 THOUSAND/UL (ref 0.6–4.47)
LYMPHOCYTES # BLD AUTO: 8 % (ref 14–44)
MCH RBC QN AUTO: 31.8 PG (ref 26.8–34.3)
MCHC RBC AUTO-ENTMCNC: 34.1 G/DL (ref 31.4–37.4)
MCV RBC AUTO: 93 FL (ref 82–98)
MONOCYTES # BLD AUTO: 0.17 THOUSAND/UL (ref 0–1.22)
MONOCYTES NFR BLD: 1 % (ref 4–12)
NEUTROPHILS # BLD MANUAL: 15.82 THOUSAND/UL (ref 1.85–7.62)
NEUTS BAND NFR BLD MANUAL: 1 % (ref 0–8)
NEUTS SEG NFR BLD AUTO: 90 % (ref 43–75)
NRBC BLD AUTO-RTO: 0 /100 WBCS
PLATELET # BLD AUTO: 223 THOUSANDS/UL (ref 149–390)
PLATELET BLD QL SMEAR: ADEQUATE
PMV BLD AUTO: 9.8 FL (ref 8.9–12.7)
POLYCHROMASIA BLD QL SMEAR: PRESENT
POTASSIUM SERPL-SCNC: 3.1 MMOL/L (ref 3.5–5.3)
PROT SERPL-MCNC: 7.4 G/DL (ref 6.4–8.2)
RBC # BLD AUTO: 3.18 MILLION/UL (ref 3.88–5.62)
RBC MORPH BLD: PRESENT
SODIUM SERPL-SCNC: 133 MMOL/L (ref 136–145)
TOTAL CELLS COUNTED SPEC: 100
WBC # BLD AUTO: 17.39 THOUSAND/UL (ref 4.31–10.16)

## 2021-07-31 PROCEDURE — U0003 INFECTIOUS AGENT DETECTION BY NUCLEIC ACID (DNA OR RNA); SEVERE ACUTE RESPIRATORY SYNDROME CORONAVIRUS 2 (SARS-COV-2) (CORONAVIRUS DISEASE [COVID-19]), AMPLIFIED PROBE TECHNIQUE, MAKING USE OF HIGH THROUGHPUT TECHNOLOGIES AS DESCRIBED BY CMS-2020-01-R: HCPCS | Performed by: EMERGENCY MEDICINE

## 2021-07-31 PROCEDURE — 83605 ASSAY OF LACTIC ACID: CPT | Performed by: EMERGENCY MEDICINE

## 2021-07-31 PROCEDURE — 80053 COMPREHEN METABOLIC PANEL: CPT | Performed by: EMERGENCY MEDICINE

## 2021-07-31 PROCEDURE — 87040 BLOOD CULTURE FOR BACTERIA: CPT | Performed by: EMERGENCY MEDICINE

## 2021-07-31 PROCEDURE — 84145 PROCALCITONIN (PCT): CPT | Performed by: EMERGENCY MEDICINE

## 2021-07-31 PROCEDURE — U0005 INFEC AGEN DETEC AMPLI PROBE: HCPCS | Performed by: EMERGENCY MEDICINE

## 2021-07-31 PROCEDURE — 36415 COLL VENOUS BLD VENIPUNCTURE: CPT | Performed by: EMERGENCY MEDICINE

## 2021-07-31 PROCEDURE — 93005 ELECTROCARDIOGRAM TRACING: CPT

## 2021-07-31 PROCEDURE — 96367 TX/PROPH/DG ADDL SEQ IV INF: CPT

## 2021-07-31 PROCEDURE — G1004 CDSM NDSC: HCPCS

## 2021-07-31 PROCEDURE — 71260 CT THORAX DX C+: CPT

## 2021-07-31 PROCEDURE — 71045 X-RAY EXAM CHEST 1 VIEW: CPT

## 2021-07-31 PROCEDURE — 99285 EMERGENCY DEPT VISIT HI MDM: CPT | Performed by: EMERGENCY MEDICINE

## 2021-07-31 PROCEDURE — 85027 COMPLETE CBC AUTOMATED: CPT | Performed by: EMERGENCY MEDICINE

## 2021-07-31 PROCEDURE — 74177 CT ABD & PELVIS W/CONTRAST: CPT

## 2021-07-31 PROCEDURE — 96361 HYDRATE IV INFUSION ADD-ON: CPT

## 2021-07-31 PROCEDURE — 85007 BL SMEAR W/DIFF WBC COUNT: CPT | Performed by: EMERGENCY MEDICINE

## 2021-07-31 PROCEDURE — 96365 THER/PROPH/DIAG IV INF INIT: CPT

## 2021-07-31 PROCEDURE — 96375 TX/PRO/DX INJ NEW DRUG ADDON: CPT

## 2021-07-31 PROCEDURE — 99285 EMERGENCY DEPT VISIT HI MDM: CPT

## 2021-07-31 RX ORDER — POTASSIUM CHLORIDE 14.9 MG/ML
20 INJECTION INTRAVENOUS ONCE
Status: COMPLETED | OUTPATIENT
Start: 2021-07-31 | End: 2021-08-01

## 2021-07-31 RX ORDER — ONDANSETRON 2 MG/ML
4 INJECTION INTRAMUSCULAR; INTRAVENOUS ONCE
Status: COMPLETED | OUTPATIENT
Start: 2021-07-31 | End: 2021-07-31

## 2021-07-31 RX ORDER — CEFTRIAXONE 2 G/50ML
2000 INJECTION, SOLUTION INTRAVENOUS ONCE
Status: COMPLETED | OUTPATIENT
Start: 2021-07-31 | End: 2021-07-31

## 2021-07-31 RX ADMIN — IOHEXOL 90 ML: 350 INJECTION, SOLUTION INTRAVENOUS at 23:04

## 2021-07-31 RX ADMIN — CEFTRIAXONE 2000 MG: 2 INJECTION, SOLUTION INTRAVENOUS at 22:55

## 2021-07-31 RX ADMIN — ONDANSETRON 4 MG: 2 INJECTION INTRAMUSCULAR; INTRAVENOUS at 23:36

## 2021-07-31 RX ADMIN — POTASSIUM CHLORIDE 20 MEQ: 14.9 INJECTION, SOLUTION INTRAVENOUS at 23:13

## 2021-07-31 RX ADMIN — SODIUM CHLORIDE 1000 ML: 0.9 INJECTION, SOLUTION INTRAVENOUS at 21:53

## 2021-08-01 PROBLEM — J98.4 PULMONARY LESION: Status: ACTIVE | Noted: 2021-08-01

## 2021-08-01 PROBLEM — R13.10 DYSPHAGIA: Status: ACTIVE | Noted: 2021-08-01

## 2021-08-01 PROBLEM — E87.6 HYPOKALEMIA: Status: ACTIVE | Noted: 2021-08-01

## 2021-08-01 PROBLEM — D72.829 LEUKOCYTOSIS: Status: ACTIVE | Noted: 2021-08-01

## 2021-08-01 PROBLEM — R59.0 MEDIASTINAL LYMPHADENOPATHY: Status: ACTIVE | Noted: 2021-08-01

## 2021-08-01 PROBLEM — E43 SEVERE PROTEIN-CALORIE MALNUTRITION (HCC): Status: ACTIVE | Noted: 2021-08-01

## 2021-08-01 PROBLEM — D64.9 ANEMIA: Status: ACTIVE | Noted: 2021-08-01

## 2021-08-01 PROBLEM — C10.9 OROPHARYNGEAL CANCER (HCC): Status: ACTIVE | Noted: 2021-08-01

## 2021-08-01 PROBLEM — I16.0 HYPERTENSIVE URGENCY: Status: ACTIVE | Noted: 2021-08-01

## 2021-08-01 PROBLEM — R16.0 HYPODENSE MASS OF LIVER: Status: ACTIVE | Noted: 2021-08-01

## 2021-08-01 LAB
AMPHETAMINES SERPL QL SCN: NEGATIVE
ANION GAP SERPL CALCULATED.3IONS-SCNC: 5 MMOL/L (ref 4–13)
ANION GAP SERPL CALCULATED.3IONS-SCNC: 6 MMOL/L (ref 4–13)
BARBITURATES UR QL: NEGATIVE
BENZODIAZ UR QL: NEGATIVE
BUN SERPL-MCNC: 17 MG/DL (ref 5–25)
BUN SERPL-MCNC: 18 MG/DL (ref 5–25)
CALCIUM SERPL-MCNC: 8.5 MG/DL (ref 8.3–10.1)
CALCIUM SERPL-MCNC: 8.8 MG/DL (ref 8.3–10.1)
CHLORIDE SERPL-SCNC: 98 MMOL/L (ref 100–108)
CHLORIDE SERPL-SCNC: 98 MMOL/L (ref 100–108)
CO2 SERPL-SCNC: 32 MMOL/L (ref 21–32)
CO2 SERPL-SCNC: 33 MMOL/L (ref 21–32)
COCAINE UR QL: POSITIVE
CREAT SERPL-MCNC: 0.63 MG/DL (ref 0.6–1.3)
CREAT SERPL-MCNC: 0.78 MG/DL (ref 0.6–1.3)
ERYTHROCYTE [DISTWIDTH] IN BLOOD BY AUTOMATED COUNT: 12.8 % (ref 11.6–15.1)
GFR SERPL CREATININE-BSD FRML MDRD: 100 ML/MIN/1.73SQ M
GFR SERPL CREATININE-BSD FRML MDRD: 109 ML/MIN/1.73SQ M
GLUCOSE SERPL-MCNC: 119 MG/DL (ref 65–140)
GLUCOSE SERPL-MCNC: 123 MG/DL (ref 65–140)
HCT VFR BLD AUTO: 31.9 % (ref 36.5–49.3)
HGB BLD-MCNC: 10.6 G/DL (ref 12–17)
MAGNESIUM SERPL-MCNC: 1.7 MG/DL (ref 1.6–2.6)
MCH RBC QN AUTO: 31.3 PG (ref 26.8–34.3)
MCHC RBC AUTO-ENTMCNC: 33.2 G/DL (ref 31.4–37.4)
MCV RBC AUTO: 94 FL (ref 82–98)
METHADONE UR QL: NEGATIVE
OPIATES UR QL SCN: NEGATIVE
OXYCODONE+OXYMORPHONE UR QL SCN: NEGATIVE
PCP UR QL: NEGATIVE
PLATELET # BLD AUTO: 153 THOUSANDS/UL (ref 149–390)
PMV BLD AUTO: 11.9 FL (ref 8.9–12.7)
POTASSIUM SERPL-SCNC: 3.2 MMOL/L (ref 3.5–5.3)
POTASSIUM SERPL-SCNC: 4.6 MMOL/L (ref 3.5–5.3)
PROCALCITONIN SERPL-MCNC: 0.36 NG/ML
RBC # BLD AUTO: 3.39 MILLION/UL (ref 3.88–5.62)
SARS-COV-2 RNA RESP QL NAA+PROBE: NEGATIVE
SODIUM SERPL-SCNC: 135 MMOL/L (ref 136–145)
SODIUM SERPL-SCNC: 137 MMOL/L (ref 136–145)
THC UR QL: NEGATIVE
WBC # BLD AUTO: 15.68 THOUSAND/UL (ref 4.31–10.16)

## 2021-08-01 PROCEDURE — 80048 BASIC METABOLIC PNL TOTAL CA: CPT | Performed by: PHYSICIAN ASSISTANT

## 2021-08-01 PROCEDURE — 99223 1ST HOSP IP/OBS HIGH 75: CPT | Performed by: INTERNAL MEDICINE

## 2021-08-01 PROCEDURE — 99222 1ST HOSP IP/OBS MODERATE 55: CPT | Performed by: OTOLARYNGOLOGY

## 2021-08-01 PROCEDURE — 80307 DRUG TEST PRSMV CHEM ANLYZR: CPT | Performed by: PHYSICIAN ASSISTANT

## 2021-08-01 PROCEDURE — 85027 COMPLETE CBC AUTOMATED: CPT | Performed by: PHYSICIAN ASSISTANT

## 2021-08-01 PROCEDURE — 83735 ASSAY OF MAGNESIUM: CPT | Performed by: PHYSICIAN ASSISTANT

## 2021-08-01 RX ORDER — BUPRENORPHINE AND NALOXONE 2; .5 MG/1; MG/1
4 FILM, SOLUBLE BUCCAL; SUBLINGUAL 2 TIMES DAILY
Status: DISCONTINUED | OUTPATIENT
Start: 2021-08-01 | End: 2021-08-04 | Stop reason: HOSPADM

## 2021-08-01 RX ORDER — HYDROXYZINE PAMOATE 50 MG/1
50 CAPSULE ORAL 3 TIMES DAILY PRN
COMMUNITY
End: 2021-08-04 | Stop reason: HOSPADM

## 2021-08-01 RX ORDER — HYDRALAZINE HYDROCHLORIDE 20 MG/ML
5 INJECTION INTRAMUSCULAR; INTRAVENOUS ONCE
Status: COMPLETED | OUTPATIENT
Start: 2021-08-01 | End: 2021-08-01

## 2021-08-01 RX ORDER — HYDRALAZINE HYDROCHLORIDE 20 MG/ML
5 INJECTION INTRAMUSCULAR; INTRAVENOUS EVERY 6 HOURS PRN
Status: DISCONTINUED | OUTPATIENT
Start: 2021-08-01 | End: 2021-08-01

## 2021-08-01 RX ORDER — ONDANSETRON 2 MG/ML
4 INJECTION INTRAMUSCULAR; INTRAVENOUS EVERY 4 HOURS PRN
Status: DISCONTINUED | OUTPATIENT
Start: 2021-08-01 | End: 2021-08-04 | Stop reason: HOSPADM

## 2021-08-01 RX ORDER — NICOTINE 21 MG/24HR
1 PATCH, TRANSDERMAL 24 HOURS TRANSDERMAL DAILY
Status: DISCONTINUED | OUTPATIENT
Start: 2021-08-01 | End: 2021-08-04 | Stop reason: HOSPADM

## 2021-08-01 RX ORDER — LORAZEPAM 2 MG/ML
0.5 INJECTION INTRAMUSCULAR ONCE
Status: COMPLETED | OUTPATIENT
Start: 2021-08-01 | End: 2021-08-01

## 2021-08-01 RX ORDER — CEFTRIAXONE 2 G/50ML
2000 INJECTION, SOLUTION INTRAVENOUS EVERY 24 HOURS
Status: DISCONTINUED | OUTPATIENT
Start: 2021-08-01 | End: 2021-08-02

## 2021-08-01 RX ORDER — ONDANSETRON 2 MG/ML
INJECTION INTRAMUSCULAR; INTRAVENOUS
Status: COMPLETED
Start: 2021-08-01 | End: 2021-08-01

## 2021-08-01 RX ORDER — SODIUM CHLORIDE, SODIUM GLUCONATE, SODIUM ACETATE, POTASSIUM CHLORIDE, MAGNESIUM CHLORIDE, SODIUM PHOSPHATE, DIBASIC, AND POTASSIUM PHOSPHATE .53; .5; .37; .037; .03; .012; .00082 G/100ML; G/100ML; G/100ML; G/100ML; G/100ML; G/100ML; G/100ML
50 INJECTION, SOLUTION INTRAVENOUS CONTINUOUS
Status: DISCONTINUED | OUTPATIENT
Start: 2021-08-01 | End: 2021-08-02

## 2021-08-01 RX ORDER — HYDRALAZINE HYDROCHLORIDE 20 MG/ML
5 INJECTION INTRAMUSCULAR; INTRAVENOUS EVERY 6 HOURS PRN
Status: DISCONTINUED | OUTPATIENT
Start: 2021-08-01 | End: 2021-08-02

## 2021-08-01 RX ORDER — POTASSIUM CHLORIDE 14.9 MG/ML
20 INJECTION INTRAVENOUS
Status: COMPLETED | OUTPATIENT
Start: 2021-08-01 | End: 2021-08-01

## 2021-08-01 RX ORDER — MAGNESIUM SULFATE 1 G/100ML
1 INJECTION INTRAVENOUS ONCE
Status: COMPLETED | OUTPATIENT
Start: 2021-08-01 | End: 2021-08-01

## 2021-08-01 RX ADMIN — CEFTRIAXONE 2000 MG: 2 INJECTION, SOLUTION INTRAVENOUS at 23:03

## 2021-08-01 RX ADMIN — NICOTINE 1 PATCH: 21 PATCH, EXTENDED RELEASE TRANSDERMAL at 08:41

## 2021-08-01 RX ADMIN — ONDANSETRON 4 MG: 2 INJECTION INTRAMUSCULAR; INTRAVENOUS at 05:33

## 2021-08-01 RX ADMIN — BUPRENORPHINE AND NALOXONE 4 MG: 2; .5 FILM BUCCAL; SUBLINGUAL at 08:41

## 2021-08-01 RX ADMIN — HYDRALAZINE HYDROCHLORIDE 5 MG: 20 INJECTION INTRAMUSCULAR; INTRAVENOUS at 04:59

## 2021-08-01 RX ADMIN — SODIUM CHLORIDE, SODIUM GLUCONATE, SODIUM ACETATE, POTASSIUM CHLORIDE, MAGNESIUM CHLORIDE, SODIUM PHOSPHATE, DIBASIC, AND POTASSIUM PHOSPHATE 100 ML/HR: .53; .5; .37; .037; .03; .012; .00082 INJECTION, SOLUTION INTRAVENOUS at 04:00

## 2021-08-01 RX ADMIN — HYDRALAZINE HYDROCHLORIDE 5 MG: 20 INJECTION INTRAMUSCULAR; INTRAVENOUS at 04:15

## 2021-08-01 RX ADMIN — SODIUM CHLORIDE, SODIUM GLUCONATE, SODIUM ACETATE, POTASSIUM CHLORIDE, MAGNESIUM CHLORIDE, SODIUM PHOSPHATE, DIBASIC, AND POTASSIUM PHOSPHATE 100 ML/HR: .53; .5; .37; .037; .03; .012; .00082 INJECTION, SOLUTION INTRAVENOUS at 23:03

## 2021-08-01 RX ADMIN — SODIUM CHLORIDE, SODIUM GLUCONATE, SODIUM ACETATE, POTASSIUM CHLORIDE, MAGNESIUM CHLORIDE, SODIUM PHOSPHATE, DIBASIC, AND POTASSIUM PHOSPHATE 100 ML/HR: .53; .5; .37; .037; .03; .012; .00082 INJECTION, SOLUTION INTRAVENOUS at 14:16

## 2021-08-01 RX ADMIN — MAGNESIUM SULFATE HEPTAHYDRATE 1 G: 1 INJECTION, SOLUTION INTRAVENOUS at 08:38

## 2021-08-01 RX ADMIN — POTASSIUM CHLORIDE 20 MEQ: 14.9 INJECTION, SOLUTION INTRAVENOUS at 06:11

## 2021-08-01 RX ADMIN — BUPRENORPHINE AND NALOXONE 4 MG: 2; .5 FILM BUCCAL; SUBLINGUAL at 20:50

## 2021-08-01 RX ADMIN — LORAZEPAM 0.5 MG: 2 INJECTION INTRAMUSCULAR; INTRAVENOUS at 21:13

## 2021-08-01 RX ADMIN — HYDRALAZINE HYDROCHLORIDE 5 MG: 20 INJECTION INTRAMUSCULAR; INTRAVENOUS at 12:29

## 2021-08-01 RX ADMIN — POTASSIUM CHLORIDE 20 MEQ: 14.9 INJECTION, SOLUTION INTRAVENOUS at 03:37

## 2021-08-01 RX ADMIN — AZITHROMYCIN MONOHYDRATE 500 MG: 500 INJECTION, POWDER, LYOPHILIZED, FOR SOLUTION INTRAVENOUS at 01:41

## 2021-08-01 NOTE — ASSESSMENT & PLAN NOTE
· CT C/A/P: "Mild mediastinal lymphadenopathy which may be reactive in nature or secondary to metastatic disease "

## 2021-08-01 NOTE — PROGRESS NOTES
Pt asked aide to alert nurse that he would like something to sleep  Pt explained that he cannot have medication to help him sleep  He has been sleeping all day, wakes appropriately when staff enters room  No pain displayed

## 2021-08-01 NOTE — PROGRESS NOTES
Sebas's SO, Radha, came outside room  Asked myself for update  Discussed with her that there is nothing that has changed in last hour  We are monitoring his VS and keeping him as comfortable as possible

## 2021-08-01 NOTE — ASSESSMENT & PLAN NOTE
· CT C/A/P: "Multifocal bilateral upper and lower lobe nodular groundglass airspace opacities, a few of which demonstrate mild cavitation    The findings are nonspecific with differential considerations including infectious or inflammatory process, such as covid-19 infection, versus metastatic disease "  · With leukocytosis ?if CAP  · Continue azithromycin and rocpehin until procalcitonin negative x2

## 2021-08-01 NOTE — MALNUTRITION/BMI
This medical record reflects one or more clinical indicators suggestive of malnutrition and/or morbid obesity  Malnutrition Findings:   Adult Malnutrition type: Acute illness  Adult Degree of Malnutrition: Other severe protein calorie malnutrition  Malnutrition Characteristics: Fat loss, Muscle loss, Inadequate energy, Weight loss (PCM d/t dysphagia AEB 12 8% wt decrease x 2 weeks  Loss of muscle mass: temples depressed, clavicle clearly visible, loss in UE's,  Loss of fat mass: orbitals are dark and hollow  Will recommend alternate nutrition as warranted )    BMI Findings:  Adult BMI Classifications: Underweight < 18 5     Body mass index is 14 8 kg/m²  See Nutrition note dated 08/01/2021 for additional details  Completed nutrition assessment is viewable in the nutrition documentation

## 2021-08-01 NOTE — ED CARE HANDOFF
Emergency Department Sign Out Note        Sign out and transfer of care from Dr Block Must  See Separate Emergency Department note  The patient, Solange Champion, was evaluated by the previous provider for oropharyngeal cancer with dehydration, pulmonary infiltrates       Workup Completed:  Examined, labs ordered and evaluated  X-ray and CT ordered  Rocephin and IV fluids given  Case discussed with the [facility]  ED Course / Workup Pending (followup): Monitor patient await for lab results and CT results  Patient will need admission  ED Course as of Aug 01 0223   Sat Jul 31, 2021   2339 Pulse now elevated; being treated for sepsis  Await CT results before admission per [facility]  Procedures  MDM    Disposition  Final diagnoses:   Pharyngeal cancer ([facility] )   Metastatic disease ([facility] )   Dehydration   Pulmonary infiltrates - Metastatic disease versus infectious   Hypokalemia     Time reflects when diagnosis was documented in both MDM as applicable and the Disposition within this note     Time User Action Codes Description Comment    7/31/2021 10:59 PM Parsons Bollard Add [C14 0] Pharyngeal cancer ([facility] )     7/31/2021 10:59 PM Parsons Bollard Add [C79 9] Metastatic disease ([facility] )     7/31/2021 10:59 PM Mari Brisk [E86 0] Dehydration     7/31/2021 10:59 PM Parsons Bollard Add [R91 8] Pulmonary infiltrates     7/31/2021 10:59 PM Parsons Bollard Modify [R91 8] Pulmonary infiltrates Metastatic disease versus infectious    7/31/2021 10:59 PM Parsons Bollard Add [E87 6] Hypokalemia       ED Disposition     None      Follow-up Information    None       Patient's Medications   Discharge Prescriptions    No medications on file     No discharge procedures on file         ED Provider  Electronically Signed by     Jewel Ybarra DO  08/01/21 5633

## 2021-08-01 NOTE — NUTRITION
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"     Per nursing note, pt is declining intubation if becomes necessary and CPR if becomes necessary  Awaiting oncology consult  When asking pt if he would agree to alternate nutrition, pt nodded affirmative  Please see below for TF rec's as warranted  Will continue to monitor pt for continuation of care  Malnutrition/BMI Present Yes  (PCM d/t dysphagia AEB 12 8% wt decrease x 2 weeks  Loss of muscle mass: temples depressed, clavicle clearly visible, loss in UE's,  Loss of fat mass: orbitals are dark and hollow  Will recommend alternate nutrition as warranted )   Adult Malnutrition type Acute illness   Adult Degree of Malnutrition Other severe protein calorie malnutrition   Malnutrition Characteristics Fat loss;Muscle loss; Inadequate energy;Weight loss  (PCM d/t dysphagia AEB 12 8% wt decrease x 2 weeks  Loss of muscle mass: temples depressed, clavicle clearly visible, loss in UE's,  Loss of fat mass: orbitals are dark and hollow  Will recommend alternate nutrition as warranted )   Adult BMI Classifications Underweight < 18 5   Interventions Speech/swallow evaluation;EN initiate   Nutrition Recommendations Initiate EN;Tube feeding recs provided  (Full strength Jevity 1 2, 20ml/hr and advance Q 4-6 hours as tolerated to goal of 65ml/hr with 165ml flushes Q6hrs  Add 1 pkt ProSource/day   Will provide 1932kcal/102g protein/1919ml fluids/day ) Nutrition Complexity Risk   Nutrition complexity level High risk   Follow up date 08/05/21  (or as otherwise consulted by MD)

## 2021-08-01 NOTE — ASSESSMENT & PLAN NOTE
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  Invasion of the right right carotid space with encasement of the right internal carotid artery without luminal narrowing and occlusion of the high cervical right IJ  Tumor extends the skull base where there is localized invasion of the right petrous bone  Suggest follow-up contrast enhanced MRI of the brain and skull base to assess extent of disease  Right transverse and sigmoid sinuses are not well seen and may be hypoplastic as there appears to be dominant left-sided drainage however chronic occlusion is also possible   No evidence of acute sinus thrombosis "  · Reported saw an ENT who said mass was inoperable - no documentation of this   · ENT consult, oncology consult, palliative care consult

## 2021-08-01 NOTE — ASSESSMENT & PLAN NOTE
· Hgb at 10 1  · Hgb at 10 5 2 weeks ago   · Suspect this is secondary to oropharyngeal cancer as pt does have some hemoptysis

## 2021-08-01 NOTE — H&P
[place]  H&P- Nava Flow 1965, 61 y o  male MRN: [id number]  Unit/Bed#: -01 SDU Encounter: [id number]  Primary Care Provider: Stuart Araya DO   Date and time admitted to hospital: 7/31/2021  9:12 PM    * Dysphagia  Assessment & Plan  · Presents to ED with increasing difficulty eating and drinking and worsening dyspnea   · Secondary to oropharyngeal mass   · Protecting airway but difficulty managing secretions - extensive drooling out of right side of face  Able to cough significant thick phlegm up on his own   · Keep strict NPO  · Speech consult   · IVF     *Pt initially admitted to med/surg tele, however on my evaluation of the patient I felt he should be upgraded to [place] [id number] for closer monitoring  Spoke with KRISTEN Garza, who saw the pt and agreed with upgrade  Hypokalemia  Assessment & Plan  · K at 3 3   · 20 mEq IV x3 ordered   · Recheck BMP in AM   · Check Mg    Oropharyngeal cancer ([address] )  Assessment & Plan  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  Invasion of the right right carotid space with encasement of the right internal carotid artery without luminal narrowing and occlusion of the high cervical right IJ  Tumor extends the skull base where there is localized invasion of the right petrous bone  Suggest follow-up contrast enhanced MRI of the brain and skull base to assess extent of disease  Right transverse and sigmoid sinuses are not well seen and may be hypoplastic as there appears to be dominant left-sided drainage however chronic occlusion is also possible   No evidence of acute sinus thrombosis "  · Reported saw an ENT who said mass was inoperable - no documentation of this   · ENT consult, oncology consult, palliative care consult     Hypertensive urgency  Assessment & Plan  · BP at 183/122   · BP with mild improvement s/p hydralazine 10mg IV to 157/102   · Hydralazine 5mg PRN for SBP >170 or DBP >110     Leukocytosis  Assessment & Plan  · Leukocytosis at 17 39K on admission   · ?if pulmonary lesions are PNA - continue rocephin and azithromycin until procal negative x2   · Monitor CBC daily     Pulmonary lesion  Assessment & Plan  · CT C/A/P: "Multifocal bilateral upper and lower lobe nodular groundglass airspace opacities, a few of which demonstrate mild cavitation  The findings are nonspecific with differential considerations including infectious or inflammatory process, such as covid-19 infection, versus metastatic disease "  · With leukocytosis ?if CAP  · Continue azithromycin and rocpehin until procalcitonin negative x2     Hypodense mass of liver  Assessment & Plan  · CT C/A/P: "A few scattered tiny hepatic hypodensities statistically most likely representing cysts or hemangiomas  However, metastatic disease cannot be excluded "    Mediastinal lymphadenopathy  Assessment & Plan  · CT C/A/P: "Mild mediastinal lymphadenopathy which may be reactive in nature or secondary to metastatic disease "    Anemia  Assessment & Plan  · Hgb at 10 1  · Hgb at 10 5 2 weeks ago   · Suspect this is secondary to oropharyngeal cancer as pt does have some hemoptysis     Narcotic dependency, continuous (HCC)  Assessment & Plan  · Per PDMP suboxone 8-2mg film BID  · Unknown last use   · Family reported to ED that pt last used heroine on Friday evening   · Will dose suboxone 4 BID to start as pt is somnolent but responsive to questions on admission     VTE Pharmacologic Prophylaxis: VTE Score: 3 Moderate Risk (Score 3-4) - Pharmacological DVT Prophylaxis Contraindicated  Sequential Compression Devices Ordered  Code Status: Level 3 - DNAR and DNI See ACP note  Discussion with family: Called patient's friend, Poornima Hess, list on the chart to obtain further history   The phone number went immediately to a voicemail without any stated name  Did not leave voicemail        Anticipated Length of Stay: Patient will be admitted on an inpatient basis with an anticipated length of stay of greater than 2 midnights secondary to oropharyngeal cancer, dysphagia, dyspnea  Total Time for Visit, including Counseling / Coordination of Care: 60 minutes Greater than 50% of this total time spent on direct patient counseling and coordination of care  Chief Complaint: "difficulty swallowing and breathing"    History of Present Illness:    Estrada Boyle is a 61 y o  male with a PMH of narcotic dependency, reported heroine abuse by family, and newly diagnosed oropharyngeal cancer on 7/16 who presents with worsening dyspnea and inability to eat or drink secondary to dysphagia  Reportedly saw ENT at [facility] and told it was inoperable tumor  Has not yet seen oncology  Pt denies drug use, alcohol use, or tobacco use  History very limited secondary to patient's muffled speech from mass and facial paralysis  Review of Systems:  Review of Systems   Constitutional: Negative for chills and fever  HENT: Positive for drooling and trouble swallowing  Negative for congestion  Difficulty talking  Respiratory: Positive for cough and shortness of breath  Cardiovascular: Positive for chest pain  Gastrointestinal: Positive for abdominal pain  Genitourinary: Negative for dysuria  Neurological: Positive for facial asymmetry  Past Medical and Surgical History:   Past Medical History:   Diagnosis Date    Drug abuse ([facility] [address] )     Gout due to renal impairment, left wrist        Past Surgical History:   Procedure Laterality Date    CARPAL TUNNEL RELEASE      HAND FRACTURE REPAIR      KNEE CARTILAGE SURGERY         Meds/Allergies:  Prior to Admission medications    Medication Sig Start Date End Date Taking?  Authorizing Provider   buprenorphine-naloxone (SUBOXONE) 8-2 mg per SL tablet Place 1 tablet under the tongue 2 (two) times a day Historical Provider, MD   diclofenac sodium (VOLTAREN) 1 % Apply 2 g topically 4 (four) times a day  Patient not taking: Reported on 8/1/2021 2/8/20 8/1/21  Franky Isabel DO   indomethacin (INDOCIN) 50 mg capsule Take 1 capsule (50 mg total) by mouth 3 (three) times a day as needed for moderate pain (take with food)  Patient not taking: Reported on 7/16/2021 4/12/18 8/1/21  Drew Longo DO     I have reviewed home medications with patient personally  Allergies: No Known Allergies    Social History:  Marital Status: Single   Occupation: n/a  Patient Pre-hospital Living Situation: Alone  Patient Pre-hospital Level of Mobility: walks  Patient Pre-hospital Diet Restrictions: none   Substance Use History:   Social History     Substance and Sexual Activity   Alcohol Use Yes    Alcohol/week: 7 0 standard drinks    Types: 3 Cans of beer, 4 Shots of liquor per week    Comment: drinks 2 - 3 times /week     Social History     Tobacco Use   Smoking Status Current Every Day Smoker    Packs/day: 1 00    Years: 40 00    Pack years: 40 00   Smokeless Tobacco Never Used     Social History     Substance and Sexual Activity   Drug Use No    Comment: FORMER       Family History:  Family History   Family history unknown: Yes       Physical Exam:     Vitals:   Blood Pressure: (!) 176/110 (08/01/21 0450)  Pulse: 81 (08/01/21 0450)  Temperature: 99 °F (37 2 °C) (08/01/21 0112)  Temp Source: Oral (08/01/21 0112)  Respirations: 20 (08/01/21 0450)  Height: 6' (182 9 cm) (07/31/21 2118)  Weight - Scale: 49 5 kg (109 lb 2 oz) (08/01/21 0108)  SpO2: 99 % (08/01/21 0450)    Physical Exam  Vitals and nursing note reviewed  Constitutional:       Appearance: He is ill-appearing  Comments: Somnolent but answers questions by shaking his head yes or no or holding up his fingers  Cachetic  HENT:      Mouth/Throat:      Comments: Extensive drooling from right side of mouth  Pt coughing up thick sputum and self-suctioning   Mumbled speech  Large mass extending from the right side of the neck to the mandible  Eyes:      Comments: Right eyelid closed  Pt primarily keeps both eyes closed but does open left eye when asked a question  Cardiovascular:      Rate and Rhythm: Normal rate and regular rhythm  Pulses: Normal pulses  Pulmonary:      Effort: Pulmonary effort is normal  No respiratory distress  Comments: Upper airway noises transmitted throughout anterior lung fields  Abdominal:      General: Abdomen is flat  Palpations: Abdomen is soft  Musculoskeletal:         General: Normal range of motion  Right lower leg: No edema  Left lower leg: No edema  Skin:     General: Skin is warm and dry  Coloration: Skin is pale  Neurological:      Cranial Nerves: Cranial nerve deficit (full right facial paralysis) present  Psychiatric:      Comments: Unable to assess          Additional Data:     Lab Results:  Results from last 7 days   Lab Units 07/31/21  2153   WBC Thousand/uL 17 39*   HEMOGLOBIN g/dL 10 1*   HEMATOCRIT % 29 6*   PLATELETS Thousands/uL 223   BANDS PCT % 1   LYMPHO PCT % 8*   MONO PCT % 1*   EOS PCT % 0     Results from last 7 days   Lab Units 07/31/21  2153   SODIUM mmol/L 133*   POTASSIUM mmol/L 3 1*   CHLORIDE mmol/L 97*   CO2 mmol/L 32   BUN mg/dL 23   CREATININE mg/dL 0 96   ANION GAP mmol/L 4   CALCIUM mg/dL 8 8   ALBUMIN g/dL 2 8*   TOTAL BILIRUBIN mg/dL 0 60   ALK PHOS U/L 66   ALT U/L 74   AST U/L 31   GLUCOSE RANDOM mg/dL 150*                 Results from last 7 days   Lab Units 07/31/21  2250   LACTIC ACID mmol/L 1 3       Imaging: Reviewed radiology reports from this admission including: abdominal/pelvic CT and CT neck  CT chest abdomen pelvis w contrast   Final Result by Gladys Mcguire MD (08/01 0016)      Multifocal bilateral upper and lower lobe nodular groundglass airspace opacities, a few of which demonstrate mild cavitation    The findings are nonspecific with differential considerations including infectious or inflammatory process, such as covid-19    infection, versus metastatic disease  Correlation with laboratory studies is recommended  Mild mediastinal lymphadenopathy which may be reactive in nature or secondary to metastatic disease  No acute intra-abdominal abnormality  No free air  Small amount of pelvic free fluid  Moderate amount of stool noted throughout the colon  No evidence of large or small bowel obstruction  A few scattered tiny hepatic hypodensities statistically most likely representing cysts or hemangiomas  However, metastatic disease cannot be excluded  Nonemergent MRI of the abdomen is recommended for further characterization  Workstation performed: [id number]         XR chest 1 view portable   ED Interpretation by Kaykay Romero DO (07/31 2254)   Diffuse pulmonary infiltrates          EKG and Other Studies Reviewed on Admission:   · EKG: NSR  HR 77 bpm  Slightly prolonged QT  No acute signs of ischemia       ** Please Note: This note has been constructed using a voice recognition system   **

## 2021-08-01 NOTE — ASSESSMENT & PLAN NOTE
· Presents to ED with increasing difficulty eating and drinking and worsening dyspnea   · Secondary to oropharyngeal mass   · Protecting airway but difficulty managing secretions - extensive drooling out of right side of face  Able to cough significant thick phlegm up on his own   · Keep strict NPO  · Speech consult   · IVF     *Pt initially admitted to med/surg tele, however on my evaluation of the patient I felt he should be upgraded to [place] [id number] for closer monitoring  Spoke with KRISTEN Rdz, who saw the pt and agreed with upgrade

## 2021-08-01 NOTE — ED PROVIDER NOTES
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  He is lost at least 20 pounds recently not able to swallow  He did see ENT at [facility] and was told that it was and inoperable tumor and that he needed to see hematology oncology which has not been arranged as of yet  History provided by:  Patient and significant other  Medical Problem  Location:  Generalized  Quality:  Weakness and fatigue  Severity:  Severe  Onset quality:  Gradual  Timing:  Constant  Progression:  Worsening  Chronicity:  New  Context:  Oropharyngeal cancer with decreased p o  Intake and generalized weakness weight loss  Relieved by:  Nothing  Worsened by:  Decreased p o  Intake  Associated symptoms: fatigue        Prior to Admission Medications   Prescriptions Last Dose Informant Patient Reported? Taking? buprenorphine-naloxone (SUBOXONE) 8-2 mg per SL tablet Unknown at Unknown time  Yes No   Sig: Place 1 tablet under the tongue 2 (two) times a day   hydrOXYzine pamoate (VISTARIL) 50 mg capsule Unknown at Unknown time Friend Yes No   Sig: Take 50 mg by mouth 3 (three) times a day as needed for itching      Facility-Administered Medications: None       Past Medical History:   Diagnosis Date    Drug abuse ([facility] [address] )     Gout due to renal impairment, left wrist        Past Surgical History:   Procedure Laterality Date    CARPAL TUNNEL RELEASE      HAND FRACTURE REPAIR      KNEE CARTILAGE SURGERY         Family History   Family history unknown: Yes     I have reviewed and agree with the history as documented      E-Cigarette/Vaping     E-Cigarette/Vaping Substances    Nicotine No     THC No     CBD No     Flavoring No     Other No     Unknown No      Social History Tobacco Use    Smoking status: Current Every Day Smoker     Packs/day: 1 00     Years: 40 00     Pack years: 40 00    Smokeless tobacco: Never Used   Vaping Use    Vaping Use: Never assessed   Substance Use Topics    Alcohol use: Yes     Alcohol/week: 7 0 standard drinks     Types: 3 Cans of beer, 4 Shots of liquor per week     Comment: drinks 2 - 3 times /week    Drug use: No     Comment: FORMER       Review of Systems   Constitutional: Positive for fatigue  Neurological: Positive for weakness  All other systems reviewed and are negative  Physical Exam  Physical Exam  Vitals and nursing note reviewed  Constitutional:       Appearance: He is ill-appearing  Comments: Frail and cachectic   HENT:      Head: Normocephalic and atraumatic  Left Ear: External ear normal       Ears:      Comments: Right external canal inflammation     Nose: Nose normal       Mouth/Throat:      Comments: Right-sided or pharyngeal mass with right-sided cervical adenopathy  Eyes:      General:         Right eye: No discharge  Left eye: No discharge  Extraocular Movements: Extraocular movements intact  Pupils: Pupils are equal, round, and reactive to light  Neck:      Comments: Extensive right-sided cervical adenopathy  Cardiovascular:      Rate and Rhythm: Normal rate and regular rhythm  Pulses: Normal pulses  Heart sounds: Normal heart sounds  No murmur heard  No friction rub  No gallop  Pulmonary:      Effort: Pulmonary effort is normal  No respiratory distress  Breath sounds: Normal breath sounds  No stridor  No wheezing, rhonchi or rales  Abdominal:      General: Abdomen is flat  There is no distension  Palpations: Abdomen is soft  Tenderness: There is no abdominal tenderness  There is no guarding or rebound  Musculoskeletal:         General: No swelling, deformity or signs of injury  Normal range of motion  Cervical back: Tenderness present  Right lower leg: No edema  Left lower leg: No edema  Lymphadenopathy:      Cervical: Cervical adenopathy present  Skin:     General: Skin is warm and dry  Findings: No erythema or rash  Neurological:      Mental Status: He is alert  Cranial Nerves: Cranial nerve deficit (Right-sided facial droop) present  Sensory: No sensory deficit  Motor: Weakness ( generalized) present  Coordination: Coordination normal    Psychiatric:         Behavior: Behavior normal          Thought Content:  Thought content normal          Vital Signs  ED Triage Vitals [07/31/21 2118]   Temperature Pulse Respirations Blood Pressure SpO2   99 °F (37 2 °C) 69 17 168/98 96 %      Temp Source Heart Rate Source Patient Position - Orthostatic VS BP Location FiO2 (%)   Oral Monitor Sitting Left arm --      Pain Score       8           Vitals:    08/02/21 0400 08/02/21 0440 08/02/21 0549 08/02/21 0700   BP: (!) 176/111 (!) 171/108 146/95 138/94   Pulse: 70 73 69 83   Patient Position - Orthostatic VS:   Lying Lying         Visual Acuity  Visual Acuity      Most Recent Value   L Pupil Size (mm)  4   R Pupil Size (mm)  4   L Pupil Shape  Round   R Pupil Shape  Round          ED Medications  Medications   azithromycin (ZITHROMAX) 500 mg in sodium chloride 0 9% 250mL IVPB 500 mg (0 mg Intravenous Stopped 8/2/21 0149)   cefTRIAXone (ROCEPHIN) IVPB (premix in dextrose) 2,000 mg 50 mL (0 mg Intravenous Stopped 8/1/21 2345)   buprenorphine-naloxone (Suboxone) film 4 mg (4 mg Sublingual Given 8/1/21 2050)   nicotine (NICODERM CQ) 21 mg/24 hr TD 24 hr patch 1 patch (1 patch Transdermal Medication Applied 8/1/21 0841)   multi-electrolyte (PLASMALYTE-A/ISOLYTE-S PH 7 4) IV solution (100 mL/hr Intravenous New Bag 8/1/21 2303)   ondansetron (ZOFRAN) injection 4 mg (4 mg Intravenous Given 8/1/21 0533)   potassium chloride 20 mEq IVPB (premix) (has no administration in time range)   hydrALAZINE (APRESOLINE) injection 10 mg (has no administration in time range)   enoxaparin (LOVENOX) subcutaneous injection 40 mg (has no administration in time range)   sodium chloride 0 9 % bolus 1,000 mL (0 mL Intravenous Stopped 7/31/21 2255)   cefTRIAXone (ROCEPHIN) IVPB (premix in dextrose) 2,000 mg 50 mL (0 mg Intravenous Stopped 7/31/21 2313)   potassium chloride 20 mEq IVPB (premix) (0 mEq Intravenous Stopped 8/1/21 0400)   iohexol (OMNIPAQUE) 350 MG/ML injection (SINGLE-DOSE) 90 mL (90 mL Intravenous Given 7/31/21 2304)   ondansetron (ZOFRAN) injection 4 mg (4 mg Intravenous Given 7/31/21 2336)   potassium chloride 20 mEq IVPB (premix) (0 mEq Intravenous Stopped 8/1/21 0836)   hydrALAZINE (APRESOLINE) injection 5 mg (5 mg Intravenous Given 8/1/21 0459)   magnesium sulfate IVPB (premix) SOLN 1 g (0 g Intravenous Stopped 8/1/21 0943)   LORazepam (ATIVAN) injection 0 5 mg (0 5 mg Intravenous Given 8/1/21 2113)       Diagnostic Studies  Results Reviewed     Procedure Component Value Units Date/Time    Blood culture #1 [[id number]] Collected: 07/31/21 2250    Lab Status: Preliminary result Specimen: Blood from Arm, Right Updated: 08/01/21 0901     Blood Culture Received in Microbiology Lab  Culture in Progress  Blood culture #2 [[id number]] Collected: 07/31/21 2250    Lab Status: Preliminary result Specimen: Blood from Hand, Right Updated: 08/01/21 0901     Blood Culture Received in Microbiology Lab  Culture in Progress  Procalcitonin with AM Reflex [[id number]]  (Abnormal) Collected: 07/31/21 2250    Lab Status: Final result Specimen: Blood from Arm, Right Updated: 08/01/21 0639     Procalcitonin 0 36 ng/ml     Novel Coronavirus (Covid-19),PCR [facility] - 2 Hour Stat [[id number]]  (Normal) Collected: 07/31/21 2250    Lab Status: Final result Specimen: Nares from Nasopharyngeal Swab Updated: 08/01/21 0000     SARS-CoV-2 Negative    Narrative:       The specimen collection materials, transport medium, and/or testing methodology utilized in the production of these test results have been proven to be reliable in a limited validation with an abbreviated program under the Emergency Utilization Authorization provided by the FDA  Testing reported as "Presumptive positive" will be confirmed with secondary testing to ensure result accuracy  Clinical caution and judgement should be used with the interpretation of these results with consideration of the clinical impression and other laboratory testing  Testing reported as "Positive" or "Negative" has been proven to be accurate according to standard laboratory validation requirements  All testing is performed with control materials showing appropriate reactivity at standard intervals  Lactic acid [[id number]]  (Normal) Collected: 07/31/21 2250    Lab Status: Final result Specimen: Blood from Arm, Right Updated: 07/31/21 2327     LACTIC ACID 1 3 mmol/L     Narrative:      Result may be elevated if tourniquet was used during collection  CBC and differential [[id number]]  (Abnormal) Collected: 07/31/21 2153    Lab Status: Final result Specimen: Blood from Arm, Right Updated: 07/31/21 2258     WBC 17 39 Thousand/uL      RBC 3 18 Million/uL      Hemoglobin 10 1 g/dL      Hematocrit 29 6 %      MCV 93 fL      MCH 31 8 pg      MCHC 34 1 g/dL      RDW 12 7 %      MPV 9 8 fL      Platelets 303 Thousands/uL      nRBC 0 /100 WBCs     Narrative: This is an appended report  These results have been appended to a previously verified report      Manual Differential(PHLEBS Do Not Order) [[id number]]  (Abnormal) Collected: 07/31/21 2153    Lab Status: Final result Specimen: Blood from Arm, Right Updated: 07/31/21 2258     Segmented % 90 %      Bands % 1 %      Lymphocytes % 8 %      Monocytes % 1 %      Eosinophils, % 0 %      Basophils % 0 %      Absolute Neutrophils 15 82 Thousand/uL      Lymphocytes Absolute 1 39 Thousand/uL      Monocytes Absolute 0 17 Thousand/uL      Eosinophils Absolute 0 00 Thousand/uL      Basophils Absolute 0 00 Thousand/uL      Total Counted 100     RBC Morphology Present     Anisocytosis Present     Basophilic Stippling Present     Hypochromia Present     Polychromasia Present     Platelet Estimate Adequate    Narrative:      WBC has been corrected due to the presence of NRBC, please see adjusted WBC     Comprehensive metabolic panel [[id number]]  (Abnormal) Collected: 07/31/21 2153    Lab Status: Final result Specimen: Blood from Arm, Right Updated: 07/31/21 2226     Sodium 133 mmol/L      Potassium 3 1 mmol/L      Chloride 97 mmol/L      CO2 32 mmol/L      ANION GAP 4 mmol/L      BUN 23 mg/dL      Creatinine 0 96 mg/dL      Glucose 150 mg/dL      Calcium 8 8 mg/dL      Corrected Calcium 9 8 mg/dL      AST 31 U/L      ALT 74 U/L      Alkaline Phosphatase 66 U/L      Total Protein 7 4 g/dL      Albumin 2 8 g/dL      Total Bilirubin 0 60 mg/dL      eGFR 87 ml/min/1 73sq m     Narrative:      [place] guidelines for Chronic Kidney Disease (CKD):     Stage 1 with normal or high GFR (GFR > 90 mL/min/1 73 square meters)    Stage 2 Mild CKD (GFR = 60-89 mL/min/1 73 square meters)    Stage 3A Moderate CKD (GFR = 45-59 mL/min/1 73 square meters)    Stage 3B Moderate CKD (GFR = 30-44 mL/min/1 73 square meters)    Stage 4 Severe CKD (GFR = 15-29 mL/min/1 73 square meters)    Stage 5 End Stage CKD (GFR <15 mL/min/1 73 square meters)  Note: GFR calculation is accurate only with a steady state creatinine                 CT chest abdomen pelvis w contrast   Final Result by Laci Guaman MD (08/01 0016)      Multifocal bilateral upper and lower lobe nodular groundglass airspace opacities, a few of which demonstrate mild cavitation  The findings are nonspecific with differential considerations including infectious or inflammatory process, such as covid-19    infection, versus metastatic disease  Correlation with laboratory studies is recommended        Mild mediastinal lymphadenopathy which may be reactive in nature or secondary to metastatic disease  No acute intra-abdominal abnormality  No free air  Small amount of pelvic free fluid  Moderate amount of stool noted throughout the colon  No evidence of large or small bowel obstruction  A few scattered tiny hepatic hypodensities statistically most likely representing cysts or hemangiomas  However, metastatic disease cannot be excluded  Nonemergent MRI of the abdomen is recommended for further characterization  Workstation performed: [id number]         XR chest 1 view portable   ED Interpretation by Leonie Escalante DO (07/31 4646)   Diffuse pulmonary infiltrates      Final Result by Trista Viera MD (08/01 1759)      New patchy nodular infiltrates bilaterally suspicious for multifocal pneumonia  Findings concur with the preliminary ER interpretation  Workstation performed: [id number]                    Procedures  Procedures         ED Course                         Initial Sepsis Screening     Row Name 07/31/21 2220                Is the patient's history suggestive of a new or worsening infection? (!) Yes (Proceed)  -JHONNY        Suspected source of infection  pneumonia  -JHONNY        Are two or more of the following signs & symptoms of infection both present and new to the patient? No  -JHONNY        Indicate SIRS criteria  Leukocytosis (WBC > 15864 IJL)  -JHONNY        If the answer is yes to both questions, suspicion of sepsis is present  --        If severe sepsis is present AND tissue hypoperfusion perists in the hour after fluid resuscitation or lactate > 4, the patient meets criteria for SEPTIC SHOCK  --        Are any of the following organ dysfunction criteria present within 6 hours of suspected infection and SIRS criteria that are NOT considered to be chronic conditions?   --        Organ dysfunction  --        Date of presentation of severe sepsis  --        Time of presentation of severe sepsis  -- Tissue hypoperfusion persists in the hour after crystalloid fluid administration, evidenced, by either:  --        Was hypotension present within one hour of the conclusion of crystalloid fluid administration?  --        Date of presentation of septic shock  --        Time of presentation of septic shock  --          User Key  (r) = Recorded By, (t) = Taken By, (c) = Cosigned By    [address] Name Provider Type    [address], DO Physician          SBIRT 20yo+      Most Recent Value   SBIRT (25 yo +)   In order to provide better care to our patients, we are screening all of our patients for alcohol and drug use  Would it be okay to ask you these screening questions? No Filed at: 07/31/2021 2136                    MDM  Number of Diagnoses or Management Options  Diagnosis management comments: Nancy pharyngeal cancer with weight loss clinical dehydration will need admission further evaluation may need PEG tube    Also extensive infiltration on chest x-ray differential includes metastatic disease versus superimposed pneumonia will get blood cultures lactic acid and treat empirically with Rocephin       Amount and/or Complexity of Data Reviewed  Clinical lab tests: ordered  Tests in the radiology section of CPT®: ordered        Disposition  Final diagnoses:   Pharyngeal cancer ([facility] )   Metastatic disease ([facility] )   Dehydration   Pulmonary infiltrates - Metastatic disease versus infectious   Hypokalemia     Time reflects when diagnosis was documented in both MDM as applicable and the Disposition within this note     Time User Action Codes Description Comment    7/31/2021 10:59 PM Adrono Salt Add [C14 0] [facility] )     7/31/2021 10:59 PM Adorno Salt Add [C79 9] Metastatic disease ([facility] )     7/31/2021 10:59 PM Adorno Salt Add [E86 0] Dehydration     7/31/2021 10:59 PM Adorno Salt Add [R91 8] Pulmonary infiltrates     7/31/2021 10:59 PM Adorno Salt Modify [R91 8] Pulmonary infiltrates Metastatic disease versus infectious    7/31/2021 10:59 PM Norman Cherry Add [E87 6] Hypokalemia       ED Disposition     ED Disposition Condition Date/Time Comment    Admit Stable Sun Aug 1, 2021 12:27 AM Case was discussed with khalida OWUSU and the patient's admission status was agreed to be Admission Status: inpatient status to the service of Dr Marissa Mcknight   Follow-up Information    None         Current Discharge Medication List      CONTINUE these medications which have NOT CHANGED    Details   buprenorphine-naloxone (SUBOXONE) 8-2 mg per SL tablet Place 1 tablet under the tongue 2 (two) times a day      hydrOXYzine pamoate (VISTARIL) 50 mg capsule Take 50 mg by mouth 3 (three) times a day as needed for itching           No discharge procedures on file      PDMP Review     None          ED Provider  Electronically Signed by           Gabriella Tian DO  08/02/21 6693

## 2021-08-01 NOTE — ASSESSMENT & PLAN NOTE
· CT C/A/P: "A few scattered tiny hepatic hypodensities statistically most likely representing cysts or hemangiomas    However, metastatic disease cannot be excluded "

## 2021-08-01 NOTE — QUICK NOTE
Called to the patient's bedside at the request of SLIM PA, [employer], for bedside evaluation, as there is concern as to his level of care given R sided facial droop, continued drooling  On arrival, the patient appears to be sleepy with eyes closed, but answers promptly to his name  He attempts to speak, however facial paralysis appears to make this difficult  He appears to be drooling continuously from the R side of his mouth  He suctions himself with Yankauer  I ask him to cough, and while first cough is notably weak, a second cough is much stronger  He appears to be protecting his airway  I do broach the subject of code status with him  I ask that, in the case that his breathing is to get worse or stop, would he want us to insert a tube and place him on a ventilator to help him breathe, to which he vehemently shakes his head no  I ask if he would want us to perform CPR should his heart stop, and he again vehemently shakes his head no  Nursing reports he has been oriented since his arrival from the ER  YENNY Edmond, present and witness to the above  Thus, Mr Gennie Meigs will be made a L3 DNR/DNI  At this point, given his facial paralysis, drooling, mild persistent lethargy, I believe he should be made a SD2 for closer monitoring, pending development of a plan today  He will remain under the primary care of SLIM, however staff is aware I am present for all questions, concerns and updates  SLIM PA in agreement  Orders placed to this effect

## 2021-08-01 NOTE — PROGRESS NOTES
Radha, pts SO, came to nurses station requesting water for Naseem Proper  Explained again that Naseem Proper cannot have anything by mouth  He cannot handle his oral secretions and allows them to drool from his mouth and/or uses suction to maintain  She shook head that she understands again

## 2021-08-01 NOTE — NURSING NOTE
Assumed care of pt approximately 0100  Pt A/Ox4, but lethargic  Difficulty speaking, nods and gestures appropriately  Pt with significant R facial droop and difficulty managing secretions, continuous drooling  Pt has strong cough and is able to spit out secretions and use Yankauer  One episode of vomiting following a coughing spell  O2 saturations 90-94% on RA, does not appear to have increased WOB  Pt transferred to stepdown level 2 at 0330 for closer monitoring  Report given to GEOVANI Saini RN

## 2021-08-01 NOTE — ED TRIAGE NOTES
Pt presents to the ED with c/o decreased oral intake and worsening sensation of trouble breathing  Pt does not speak and the significant other speaks for him reporting that the tumor is getting larger, otolaryngology reported he is not a surgical case and they are awaiting to hear back from oncology

## 2021-08-01 NOTE — CONSULTS
Otolaryngology (ENT) Consultation Note     Clarice Deshpande  [id number]  1965       Chief Complaint: Dysphagia, OP Cancer    History of Present Illness: 61year old man who presented to [address] [place] [address] ED for further weakness, decreased oral intake, and failure to thrive  He is a poor historian and is limited by his discomfort in talking  Per medical records and talking with hospital staff, he has been seen by ENT in [facility] and does have a biopsy that was consistent with H&N cancer  He states that he has had scope exam in the past as well  He was told that it was an inoperable cancer and was referred to oncology but still has not seen them yet  He has difficult time eating and has been losing weight  He also notes that the right side of his face does not move  Social situation is unclear at this time, but does have a significant other/friend  Of note, UDS did come back positive for cocaine  He does have electrolyte abnormalities consistent with malnutrition and failure to thrive  No Known Allergies    Past Medical History:   Diagnosis Date    Drug abuse ([facility] [address] )     Gout due to renal impairment, left wrist        Past Surgical History:   Procedure Laterality Date    CARPAL TUNNEL RELEASE      HAND FRACTURE REPAIR      KNEE CARTILAGE SURGERY         Social History     Socioeconomic History    Marital status: Single     Spouse name: Not on file    Number of children: Not on file    Years of education: Not on file    Highest education level: Not on file   Occupational History    Not on file   Tobacco Use    Smoking status: Current Every Day Smoker     Packs/day: 1 00     Years: 40 00     Pack years: 40 00    Smokeless tobacco: Never Used   Vaping Use    Vaping Use: Never assessed   Substance and Sexual Activity    Alcohol use:  Yes     Alcohol/week: 7 0 standard drinks     Types: 3 Cans of beer, 4 Shots of liquor per week     Comment: drinks 2 - 3 times /week    Drug use: No     Comment: FORMER    Sexual activity: Not on file     Comment: not asked   Other Topics Concern    Not on file   Social History Narrative    Not on file     Social Determinants of Health     Financial Resource Strain:     Difficulty of Paying Living Expenses:    Food Insecurity:     Worried About Running Out of Food in the Last Year:     [address] in the Last Year:    Transportation Needs:     Lack of Transportation (Medical):  Lack of Transportation (Non-Medical):    Physical Activity:     Days of Exercise per Week:     Minutes of Exercise per Session:    Stress:     Feeling of Stress :    Social Connections:     Frequency of Communication with Friends and Family:     Frequency of Social Gatherings with Friends and Family:     Attends Mu-ism Services:     Active Member of Clubs or Organizations:     Attends Club or Organization Meetings:     Marital Status:    Intimate Partner Violence:     Fear of Current or Ex-Partner:     Emotionally Abused:     Physically Abused:     Sexually Abused:        Family History   Family history unknown: Yes       No current facility-administered medications on file prior to encounter  Current Outpatient Medications on File Prior to Encounter   Medication Sig Dispense Refill    buprenorphine-naloxone (SUBOXONE) 8-2 mg per SL tablet Place 1 tablet under the tongue 2 (two) times a day      hydrOXYzine pamoate (VISTARIL) 50 mg capsule Take 50 mg by mouth 3 (three) times a day as needed for itching      [DISCONTINUED] diclofenac sodium (VOLTAREN) 1 % Apply 2 g topically 4 (four) times a day (Patient not taking: Reported on 8/1/2021) 1 Tube 0    [DISCONTINUED] indomethacin (INDOCIN) 50 mg capsule Take 1 capsule (50 mg total) by mouth 3 (three) times a day as needed for moderate pain (take with food) (Patient not taking: Reported on 7/16/2021) 30 capsule 0       Review of Systems:  10-point ROS performed  Patient denies fevers or chills    All other systems reviewed and found to be negative unless otherwise noted in the HPI  Vitals:    08/01/21 1500   BP: 165/96   Pulse: 86   Resp: 21   Temp:    SpO2: 96%       General Appearance: Lethargic  Head: Normocephalic, atraumatic  Face: Right facial paresis noted, HB V/VII    Eyes: Conjunctiva clear, extraocular movements are intact  Ears: Pinna normal shape and position  Canals are clear  TMs intact with no middle ear effusion  Nose: External pyramid midline  Oral cavity/Oropharynx:  Difficult to examine due to patient discomfort but patient appears edentulous with large amount of secretions in the mouth  Difficult to examine more posteriorly due to the patient intolerance/pain  Neck: Large, matted lymphadenopathy, diffuse in the right neck with corresponding tenderness  Skin: Skin warm and dry  Respiratory: No stridor or labored breathing  Cardiovascular: Good perfusion of the upper extremities  No cyanosis of the fingers or hands  Data Reviewed: CT is reviewed with large mass centered around right BOT with extension into the right neck, encasement of carotid and occlusion of IJ and extending up to the skull base  He has upper and lower lung nodular opacities bilaterally with some cavitation, mediastinal lymphadenopathy  Assessment: 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, advanced  Plan:  Discussed with patient and team that prognosis is poor given advanced stage of cancer at this time  I do not have access to the outside ENT records, but per my discussions he has had this biopsied in the past   By clinical history and exam, this is head and neck squamous cell carcinoma until proven otherwise  It would be prudent to try and access his outside records from his ENT to confirm this  Because of the location and advanced stage,  I agree with the outside assessment that this inoperable, possibly even metastatic    He probably needs more staging workup including PET-CT scan but I would imagine any attempts of treatment would be purely palliative especially in light of advanced cancer stage, poor functional status and unclear social situation  Oncology is on board, and palliative care was consulted  Goal clarification is important in this case due to the patient's desire for treatment such as further workup, chemo, need for alternative feeding such as g-tube, trach for airway obstruction as disease progresses, etc   No acute ENT intervention at this time       Paradise Padilla MD  Otolaryngology - Head & Neck Surgery  [facility]

## 2021-08-01 NOTE — ASSESSMENT & PLAN NOTE
Present on admission as evidenced by systolic blood pressure of 183/122, given dysfunction NPO status  Continue p r n   Hydralazine 10 mg every 6 hours with parameters

## 2021-08-01 NOTE — ASSESSMENT & PLAN NOTE
· Leukocytosis at 17 39K on admission   · ?if pulmonary lesions are PNA - continue rocephin and azithromycin until procal negative x2   · Monitor CBC daily

## 2021-08-01 NOTE — PROGRESS NOTES
Pts significant other Adilson Oglesby arrived to unit  Would like update  Discussed that he was transferred to [facility] due to BPs being high and secretion issues  Would now like a physician update  TT Dr Adalid Estrada for update  She will give bedside update

## 2021-08-01 NOTE — PLAN OF CARE
Problem: PAIN - ADULT  Goal: Verbalizes/displays adequate comfort level or baseline comfort level  Description: Interventions:  - Encourage patient to monitor pain and request assistance  - Assess pain using appropriate pain scale  - Administer analgesics based on type and severity of pain and evaluate response  - Implement non-pharmacological measures as appropriate and evaluate response  - Consider cultural and social influences on pain and pain management  - Notify physician/advanced practitioner if interventions unsuccessful or patient reports new pain  Outcome: Progressing     Problem: INFECTION - ADULT  Goal: Absence or prevention of progression during hospitalization  Description: INTERVENTIONS:  - Assess and monitor for signs and symptoms of infection  - Monitor lab/diagnostic results  - Monitor all insertion sites, i e  indwelling lines, tubes, and drains  - Monitor endotracheal if appropriate and nasal secretions for changes in amount and color  - Dana appropriate cooling/warming therapies per order  - Administer medications as ordered  - Instruct and encourage patient and family to use good hand hygiene technique  - Identify and instruct in appropriate isolation precautions for identified infection/condition  Outcome: Progressing  Goal: Absence of fever/infection during neutropenic period  Description: INTERVENTIONS:  - Monitor WBC    Outcome: Progressing     Problem: SAFETY ADULT  Goal: Patient will remain free of falls  Description: INTERVENTIONS:  - Educate patient/family on patient safety including physical limitations  - Instruct patient to call for assistance with activity   - Consult OT/PT to assist with strengthening/mobility   - Keep Call bell within reach  - Keep bed low and locked with side rails adjusted as appropriate  - Keep care items and personal belongings within reach  - Initiate and maintain comfort rounds  - Make Fall Risk Sign visible to staff  - Offer Toileting every 2 Hours, in advance of need  - Initiate/Maintain bed alarm  - Obtain necessary fall risk management equipment: yellow socks/bracelet, low bed, side rails, call bell in reach  - Apply yellow socks and bracelet for high fall risk patients  - Consider moving patient to room near nurses station  Outcome: Progressing  Goal: Maintain or return to baseline ADL function  Description: INTERVENTIONS:  -  Assess patient's ability to carry out ADLs; assess patient's baseline for ADL function and identify physical deficits which impact ability to perform ADLs (bathing, care of mouth/teeth, toileting, grooming, dressing, etc )  - Assess/evaluate cause of self-care deficits   - Assess range of motion  - Assess patient's mobility; develop plan if impaired  - Assess patient's need for assistive devices and provide as appropriate  - Encourage maximum independence but intervene and supervise when necessary  - Involve family in performance of ADLs  - Assess for home care needs following discharge   - Consider OT consult to assist with ADL evaluation and planning for discharge  - Provide patient education as appropriate  Outcome: Progressing  Goal: Maintains/Returns to pre admission functional level  Description: INTERVENTIONS:  - Perform BMAT or MOVE assessment daily    - Set and communicate daily mobility goal to care team and patient/family/caregiver  - Collaborate with rehabilitation services on mobility goals if consulted  - Perform Range of Motion 3 times a day  - Reposition patient every 2 hours    - Dangle patient 3 times a day  - Stand patient 2 times a day  - Ambulate patient 1 times a day  - Out of bed to chair 1 times a day   - Out of bed for meals 1 times a day  - Out of bed for toileting  - Record patient progress and toleration of activity level   Outcome: Progressing     Problem: DISCHARGE PLANNING  Goal: Discharge to home or other facility with appropriate resources  Description: INTERVENTIONS:  - Identify barriers to discharge w/patient and caregiver  - Arrange for needed discharge resources and transportation as appropriate  - Identify discharge learning needs (meds, wound care, etc )  - Arrange for interpretive services to assist at discharge as needed  - Refer to Case Management Department for coordinating discharge planning if the patient needs post-hospital services based on physician/advanced practitioner order or complex needs related to functional status, cognitive ability, or social support system  Outcome: Progressing     Problem: Knowledge Deficit  Goal: Patient/family/caregiver demonstrates understanding of disease process, treatment plan, medications, and discharge instructions  Description: Complete learning assessment and assess knowledge base    Interventions:  - Provide teaching at level of understanding  - Provide teaching via preferred learning methods  Outcome: Progressing

## 2021-08-01 NOTE — ASSESSMENT & PLAN NOTE
· Per PDMP suboxone 8-2mg film BID  · Unknown last use   · Family reported to ED that pt last used heroine on Friday evening   · Will dose suboxone 4 BID to start as pt is somnolent but responsive to questions on admission

## 2021-08-01 NOTE — SPEECH THERAPY NOTE
Dysphagia consult received  SLP s/w RN who reports patient is lethargic and unable to manage secretions today  Not appropriate for swallow evaluation at this time  ST will f/u and assess when able

## 2021-08-01 NOTE — ACP (ADVANCE CARE PLANNING)
Spoke with patient about code status  We discussed all available options  Pt vehemently shook his head "no" when asked if he would want intubation if required  He also shook his head "no" when asked about CPR  This conversation was witnessed by [employer]  The same questions were again asked by KRISTEN Terrell, with CC who received the same response  Level 3 DNR/DNI ordered

## 2021-08-02 PROBLEM — J18.9 PNEUMONIA: Status: ACTIVE | Noted: 2021-08-01

## 2021-08-02 LAB
ANION GAP SERPL CALCULATED.3IONS-SCNC: 10 MMOL/L (ref 4–13)
ATRIAL RATE: 77 BPM
BUN SERPL-MCNC: 21 MG/DL (ref 5–25)
CALCIUM SERPL-MCNC: 8.6 MG/DL (ref 8.3–10.1)
CHLORIDE SERPL-SCNC: 96 MMOL/L (ref 100–108)
CO2 SERPL-SCNC: 30 MMOL/L (ref 21–32)
CREAT SERPL-MCNC: 0.69 MG/DL (ref 0.6–1.3)
ERYTHROCYTE [DISTWIDTH] IN BLOOD BY AUTOMATED COUNT: 13 % (ref 11.6–15.1)
GFR SERPL CREATININE-BSD FRML MDRD: 105 ML/MIN/1.73SQ M
GLUCOSE SERPL-MCNC: 93 MG/DL (ref 65–140)
HCT VFR BLD AUTO: 33.4 % (ref 36.5–49.3)
HGB BLD-MCNC: 10.9 G/DL (ref 12–17)
MCH RBC QN AUTO: 31.1 PG (ref 26.8–34.3)
MCHC RBC AUTO-ENTMCNC: 32.6 G/DL (ref 31.4–37.4)
MCV RBC AUTO: 95 FL (ref 82–98)
P AXIS: 79 DEGREES
PLATELET # BLD AUTO: 190 THOUSANDS/UL (ref 149–390)
PMV BLD AUTO: 11.7 FL (ref 8.9–12.7)
POTASSIUM SERPL-SCNC: 3.1 MMOL/L (ref 3.5–5.3)
PR INTERVAL: 142 MS
PROCALCITONIN SERPL-MCNC: 0.13 NG/ML
QRS AXIS: 85 DEGREES
QRSD INTERVAL: 84 MS
QT INTERVAL: 404 MS
QTC INTERVAL: 457 MS
RBC # BLD AUTO: 3.51 MILLION/UL (ref 3.88–5.62)
SODIUM SERPL-SCNC: 136 MMOL/L (ref 136–145)
T WAVE AXIS: 74 DEGREES
VENTRICULAR RATE: 77 BPM
WBC # BLD AUTO: 12.66 THOUSAND/UL (ref 4.31–10.16)

## 2021-08-02 PROCEDURE — 99223 1ST HOSP IP/OBS HIGH 75: CPT | Performed by: FAMILY MEDICINE

## 2021-08-02 PROCEDURE — 92610 EVALUATE SWALLOWING FUNCTION: CPT

## 2021-08-02 PROCEDURE — 85027 COMPLETE CBC AUTOMATED: CPT | Performed by: INTERNAL MEDICINE

## 2021-08-02 PROCEDURE — 99356 PR PROLONGED SVC I/P OR OBS SETTING 1ST HOUR: CPT | Performed by: FAMILY MEDICINE

## 2021-08-02 PROCEDURE — 80048 BASIC METABOLIC PNL TOTAL CA: CPT | Performed by: INTERNAL MEDICINE

## 2021-08-02 PROCEDURE — 93010 ELECTROCARDIOGRAM REPORT: CPT | Performed by: INTERNAL MEDICINE

## 2021-08-02 PROCEDURE — 84145 PROCALCITONIN (PCT): CPT | Performed by: EMERGENCY MEDICINE

## 2021-08-02 PROCEDURE — 99232 SBSQ HOSP IP/OBS MODERATE 35: CPT | Performed by: INTERNAL MEDICINE

## 2021-08-02 PROCEDURE — 99255 IP/OBS CONSLTJ NEW/EST HI 80: CPT | Performed by: NURSE PRACTITIONER

## 2021-08-02 RX ORDER — MORPHINE SULFATE 10 MG/ML
5 INJECTION, SOLUTION INTRAMUSCULAR; INTRAVENOUS
Status: DISCONTINUED | OUTPATIENT
Start: 2021-08-02 | End: 2021-08-04 | Stop reason: HOSPADM

## 2021-08-02 RX ORDER — LORAZEPAM 2 MG/ML
0.5 INJECTION INTRAMUSCULAR ONCE
Status: COMPLETED | OUTPATIENT
Start: 2021-08-02 | End: 2021-08-02

## 2021-08-02 RX ORDER — HYDRALAZINE HYDROCHLORIDE 20 MG/ML
10 INJECTION INTRAMUSCULAR; INTRAVENOUS EVERY 6 HOURS PRN
Status: DISCONTINUED | OUTPATIENT
Start: 2021-08-02 | End: 2021-08-04 | Stop reason: HOSPADM

## 2021-08-02 RX ORDER — HALOPERIDOL 5 MG/ML
2 INJECTION INTRAMUSCULAR EVERY 2 HOUR PRN
Status: DISCONTINUED | OUTPATIENT
Start: 2021-08-02 | End: 2021-08-04 | Stop reason: HOSPADM

## 2021-08-02 RX ORDER — POTASSIUM CHLORIDE 14.9 MG/ML
20 INJECTION INTRAVENOUS
Status: COMPLETED | OUTPATIENT
Start: 2021-08-02 | End: 2021-08-02

## 2021-08-02 RX ORDER — LORAZEPAM 2 MG/ML
0.25 INJECTION INTRAMUSCULAR EVERY 6 HOURS PRN
Status: DISCONTINUED | OUTPATIENT
Start: 2021-08-02 | End: 2021-08-02

## 2021-08-02 RX ORDER — HYDROXYZINE HCL 10 MG/5 ML
SOLUTION, ORAL ORAL
Status: CANCELLED | OUTPATIENT
Start: 2021-08-02

## 2021-08-02 RX ADMIN — POTASSIUM CHLORIDE 20 MEQ: 14.9 INJECTION, SOLUTION INTRAVENOUS at 07:19

## 2021-08-02 RX ADMIN — LORAZEPAM 0.25 MG: 2 INJECTION INTRAMUSCULAR; INTRAVENOUS at 15:47

## 2021-08-02 RX ADMIN — POTASSIUM CHLORIDE 20 MEQ: 14.9 INJECTION, SOLUTION INTRAVENOUS at 09:03

## 2021-08-02 RX ADMIN — SODIUM CHLORIDE, SODIUM GLUCONATE, SODIUM ACETATE, POTASSIUM CHLORIDE, MAGNESIUM CHLORIDE, SODIUM PHOSPHATE, DIBASIC, AND POTASSIUM PHOSPHATE 50 ML/HR: .53; .5; .37; .037; .03; .012; .00082 INJECTION, SOLUTION INTRAVENOUS at 11:14

## 2021-08-02 RX ADMIN — LORAZEPAM 0.25 MG: 2 INJECTION INTRAMUSCULAR; INTRAVENOUS at 11:06

## 2021-08-02 RX ADMIN — LORAZEPAM 0.5 MG: 2 INJECTION INTRAMUSCULAR; INTRAVENOUS at 22:50

## 2021-08-02 RX ADMIN — AZITHROMYCIN MONOHYDRATE 500 MG: 500 INJECTION, POWDER, LYOPHILIZED, FOR SOLUTION INTRAVENOUS at 00:14

## 2021-08-02 RX ADMIN — MORPHINE SULFATE 5 MG: 10 INJECTION INTRAVENOUS at 16:46

## 2021-08-02 RX ADMIN — BUPRENORPHINE AND NALOXONE 4 MG: 2; .5 FILM BUCCAL; SUBLINGUAL at 22:43

## 2021-08-02 RX ADMIN — POTASSIUM CHLORIDE 20 MEQ: 14.9 INJECTION, SOLUTION INTRAVENOUS at 11:01

## 2021-08-02 RX ADMIN — MORPHINE SULFATE 5 MG: 10 INJECTION INTRAVENOUS at 18:44

## 2021-08-02 RX ADMIN — BUPRENORPHINE AND NALOXONE 4 MG: 2; .5 FILM BUCCAL; SUBLINGUAL at 08:58

## 2021-08-02 RX ADMIN — MORPHINE SULFATE 5 MG: 10 INJECTION INTRAVENOUS at 22:50

## 2021-08-02 RX ADMIN — ENOXAPARIN SODIUM 40 MG: 100 INJECTION SUBCUTANEOUS at 08:58

## 2021-08-02 RX ADMIN — NICOTINE 1 PATCH: 21 PATCH, EXTENDED RELEASE TRANSDERMAL at 08:59

## 2021-08-02 RX ADMIN — HYDRALAZINE HYDROCHLORIDE 5 MG: 20 INJECTION INTRAMUSCULAR; INTRAVENOUS at 04:42

## 2021-08-02 NOTE — PROGRESS NOTES
Camera up in Sebas's room  Upon checking camera, saw girlfriend giving him ice tea  Entered room to patient coughing  Pt VS unchanged and stable  Asked patients family member to remove drinks and cigarettes from room  Spoke with her yesterday and this morning about drinks, food, cigarettes and prescription medications int he room  Stated she understood this yesterday and again this morning  Asked her to remove all her items from the room  Charge RN, Melodee Lefort made aware of situation, as well as Dr Will Dominique

## 2021-08-02 NOTE — NURSING NOTE
Received patient to unit  Upon arrival he reports of pain  Patient is resting in bed  Medications will be administered as permitted  Call bell in reach

## 2021-08-02 NOTE — CONSULTS
3:05 - received a call from oncology [address]  After a detailed conversation with their team patient has decided to transition to comfort care  I placed a hospice consult and updated medications  Attempting to arrange placement with case management team and Hospice Liaisons as patient is appropriate for an inpatient level of care secondary to severe dysphagia and the need for IV medications for symptom management  Orders placed  Hospice Referral  Haldol 2mg IV Q2h PRN  Morphine 5mg IV Q1h PRN        Consultation - Palliative and Supportive Care   Eloisa Rush 61 y o  male [phone]    Visit completed with attending physician Dr Romelia Lewis  Assessment:  Oropharyngeal Cancer  Hypodense mass of liver  Mediastinal lymphadenopathy  Pulmonary lesion  Dysphagia  Pneumonia  Hypertensive urgency  Severe protein-calorie malnutrition  Hypokalemia  Anemia  Drug abuse - positive for cocaine  Goals of care counseling    Goals:  Level 3 code status  Family meeting scheduled for 2:00 tomorrow afternoon  Patient names S/O Colleen Nance as his HCA  He has been with [employer] for 20 years  Her contact information is [phone]  Patient reports no other family is involved in his life  Patient reports no biological children and he is unmarried  Palliative and Supportive Care - Inventory of challenges   - 1 - Acute symptom management   Patient's only symptom complaint today is anxiety  Patient himself has not endorsed any pain to nursing team since admission               - Ativan - could have a role in acute anxiety management; need to be watchful for drug-drug interactions with potentially unknown other substances pt has used illicitly  Ativan 0 25 mg Q6h PRN started today  Will monitor effectiveness and side effects today prior to making any adjustments                - If needed could consider a transition to Haldol - potentially much more appropriate in critical care setting where we may need to reduce anxiety but preserve alterness and resp effort  Pt is monitored, and may use IV haldol while not violating medical policy for this med  = 1-2mg IV haldol is not inappropriate in this situation, q3-4hrs       - 2 - Mass and its effects  Oncology to see patient this afternoon after 2:00               = mass is inoperable, and is occluding unilateral/ipsilateral major vessels  ? XRT could be considered urgently to help relieve obstruction of swallow  Would need Xfer of care to [facility] for urgent Rad/Onc consult  ? Spit fistula could be considered to alleviate skin injury from salivary drainage from mouth  Defer to ENT on this matter  ? Shahnaz Radha could be considered non urgently to protect airway  Defer to ENT on this matter  May be of limited utility if pt chooses hospice cares  ? PEG could be considered if and only if pt will pursue life-prolonging / restorative cares  PEG may be dangerous in the community for this person with addiction, and PEG would not be useful in a comfort-cares setting      - 3 - competence and addiction              = Appears to be listening and absorbing info on exam   Has not traditionally made good decisions with controlled substances  He is able to appropriately respond by handwriting answers on paper  + cocaine on admit              ? suboxone on admit  'Wife' reports that pt has not been using suboxone as Rxed at home, has a great deal of spare meds  = PDMP shows 82days of opioids in form of buprenorphine have been written for pt' in July  Most of these are from [facility], but there are other providers as well  RESOLVED - continue with suboxone orders as reported and recorded, and currently ordered  More information should be obtained from Dr Alexandro Mir    There may be utility in obtaining comprehensive drugs of abuse testing  RESOLVED - No controlled substances on D/C unless in a supervised setting      - 4 - Patient Safety              = Pt is a vulnerable adult, with addiction as noted and an inability at this time to literally speak for himself               = It is noted by care staff that pt has dubious visitors with little connection to or knowledge of patient's history, status, or even name  = Without judgment, we would be concerned that community contacts of an addicted individual might be bearers of illicit substances that should not be administered to pt in this setting, and that some community members may themselves be addicted  = We strongly suggest that hospital security/risk teams be invited to consult on this case, and consider if a restricted visitation policy or bag check or continuous observation should be deployed in this case for safety concerns  I have reviewed the patient's controlled substance dispensing history in the Prescription Drug Monitoring Program in compliance with the [facility] regulations before prescribing any controlled substances  Last refills    Decisional apparatus:  Patient appearscompetent based on written answers to my questions today  If competence is lost, patient name Toya William [phone] as his HCA  Will complete 5 Wishes at family meeting tomorrow in order to have this decision in writing  Advance Directive / Living Will / POLST: none on file  We appreciate the invitation to be involved in this patient's care  We will continue to follow throughout this hospitalization  Please do not hesitate to reach our on call provider through our clinic answering service at  should you have acute symptom control concerns  Jeanne Kaur, SURENDRA, CRNP, [facility]  Palliative and Supportive Care  Clinic/Answering Service: [phone]  You can find me on [employer]!      IDENTIFICATION:  Inpatient consult to Palliative Care  Consult performed by: KRISTEN Finch  Consult ordered by: Neel Harper PA-C      Physician Requesting Consult: Jai Peralta MD  Reason for Consult / Principal Problem: GOC counseling and SM secondary to oropharyngeal cancer, hypodense mass of liver, mediastinal lymphadenopathy, pulmonary lesion, dysphagia and pneumonia    History of Present Illness:  Stella Russell is a 61 y o  male who presents with a palliative diagnosis of oropharyngeal cancer  He was brought into the ED at [facility] on 7/31/21 secondary to decreased PO intake, weakness and the sensation of trouble breathing  Has lost 20 lbs in recent weeks  He is unable to swallow  Please see note from Dr Arzola Sat  1st visit  Patient was seen with his significant others "best friend" present  Her name is Radha  S/O has reportedly gone home to get patient's cell phone  Patient is unable to speak due to weak voice, inability to manage his own secretions and trouble swallowing  He does write some answers down on paper but appears to do this mostly when frustrated  He reports his biggest concern today is anxiety and he writes down on paper that he would like Vistaril ordered  Unfortunately patient is NPO even for sips with medications  His only anxiety medicine that can be ordered will have to be IV  Patient himself reported no pain this morning  Gently began a goals of care conversation as there is no family present  When asked if he is legally  he notes his head no and also says he does not have adult children  He reports that his significant other (also named Shakeel Summers) is the only person in his life  I introduced [place] and he reports that he would want Shakeel Summers to be his decision maker  He writes down on paper "this is not curable" which I confirmed and explained the difference between curative treatment and palliative treatment    He has not seen Oncology yet so we agree it may be best to get their input prior to further goals of care conversations additionally would be beneficial to have some type of family present  Of note - friend Yeyo Hearn who is present for this visit reports she is S/O's best friend however is unable to provide me with S/O's last name, additionally she did not know patient's real name this morning when she checked in at the information desk  2nd visit  Revisited patient to check in on anxiety after ativan and provide my contact information for his S/O who has now arrived  She is pleasant and is supportive of patient  Introduced the idea of disease focused therapy versus comfort care  Both patient and S/O would like to wait to hear from oncology before making decisions regarding goals of care  Yeyo Hearn is agreeable to a family meeting tomorrow at 2:00 after they have all specialist input  Yeyo Hearn is also agreeable to becoming patient's HCA   5 Wishes discussed and we will complete this tomorrow at the family meeting  Review of Systems   Constitutional: Positive for decreased appetite, malaise/fatigue and weight loss  HENT: Positive for odynophagia  Cardiovascular: Positive for dyspnea on exertion  Respiratory: Positive for cough, shortness of breath and sputum production  Hematologic/Lymphatic: Bruises/bleeds easily  Skin: Positive for dry skin  Musculoskeletal: Positive for myalgias  Neurological: Positive for difficulty with concentration and weakness  Psychiatric/Behavioral: Positive for depression  The patient has insomnia and is nervous/anxious        All other systems are negative    Past Medical History:   Diagnosis Date    Drug abuse ([facility] [address] )     Gout due to renal impairment, left wrist      Past Surgical History:   Procedure Laterality Date    CARPAL TUNNEL RELEASE      HAND FRACTURE REPAIR      KNEE CARTILAGE SURGERY       Social History     Socioeconomic History    Marital status: Single     Spouse name: Not on file    Number of children: Not on file    Years of education: Not on file    Highest education level: Not on file   Occupational History    Not on file   Tobacco Use    Smoking status: Current Every Day Smoker     Packs/day: 1 00     Years: 40 00     Pack years: 40 00    Smokeless tobacco: Never Used   Vaping Use    Vaping Use: Never assessed   Substance and Sexual Activity    Alcohol use: Yes     Alcohol/week: 7 0 standard drinks     Types: 3 Cans of beer, 4 Shots of liquor per week     Comment: drinks 2 - 3 times /week    Drug use: No     Comment: FORMER    Sexual activity: Not on file     Comment: not asked   Other Topics Concern    Not on file   Social History Narrative    Not on file     Social Determinants of Health     Financial Resource Strain:     Difficulty of Paying Living Expenses:    Food Insecurity:     Worried About Running Out of Food in the Last Year:     Ran Out of Food in the Last Year:    Transportation Needs:     Lack of Transportation (Medical):  Lack of Transportation (Non-Medical):    Physical Activity:     Days of Exercise per Week:     Minutes of Exercise per Session:    Stress:     Feeling of Stress :    Social Connections:     Frequency of Communication with Friends and Family:     Frequency of Social Gatherings with Friends and Family:     Attends Christianity Services:     Active Member of Clubs or Organizations:     Attends Club or Organization Meetings:     Marital Status:    Intimate Partner Violence:     Fear of Current or Ex-Partner:     Emotionally Abused:     Physically Abused:     Sexually Abused:      Family History   Family history unknown: Yes     Medications:  all current active meds have been reviewed    No Known Allergies    Objective:  /94 (BP Location: Right arm)   Pulse 83   Temp 98 °F (36 7 °C) (Oral)   Resp 14   Ht 6' (1 829 m)   Wt 49 5 kg (109 lb 2 oz)   SpO2 95%   BMI 14 80 kg/m²   Physical Exam:  Constitutional: Appears weak, frail and cachetic  Is unkept     Head: Normocephalic and atraumatic  Temporal wwasting  Eyes: EOM are normal  No ocular discharge  No scleral icterus  Sunken orbits  Neck: no visible adenopathy or masses   Respiratory: Effort normal  No stridor  No respiratory distress  Wearing O2 via NC  Gastrointestinal: No abdominal distension  scaphoid abdomen  Musculoskeletal: No edema  Severe muscle wasting and fat loss present  Neurological: Alert, oriented evident by written answers  Skin: Dry, no diaphoresis  Pale  Psychiatric: Anxious    Lab Results:   I have personally reviewed pertinent labs  , CBC:   Lab Results   Component Value Date    WBC 12 66 (H) 08/02/2021    HGB 10 9 (L) 08/02/2021    HCT 33 4 (L) 08/02/2021    MCV 95 08/02/2021     08/02/2021    MCH 31 1 08/02/2021    MCHC 32 6 08/02/2021    RDW 13 0 08/02/2021    MPV 11 7 08/02/2021   , CMP:   Lab Results   Component Value Date    SODIUM 136 08/02/2021    K 3 1 (L) 08/02/2021    CL 96 (L) 08/02/2021    CO2 30 08/02/2021    BUN 21 08/02/2021    CREATININE 0 69 08/02/2021    CALCIUM 8 6 08/02/2021    EGFR 105 08/02/2021     Counseling / Coordination of Care  Total floor / unit time spent today 140 minutes  Greater than 50% of total time was spent with the patient and / or family counseling and / or coordination of care  A description of the counseling / coordination of care:  provided medical updates, determined goals of care, discussed palliative care and symptom management, discussed hospice care, determined competency and POA/HCA, determined social/family support, provided psychosocial support  Reviewed with GEOVANI RAMON and CM

## 2021-08-02 NOTE — PROGRESS NOTES
Rounded bedside with Palliative medicine, will be back rounding today and family meeting tomorrow at 1400

## 2021-08-02 NOTE — PROGRESS NOTES
Pt moved to room 305 for better visualization to nurses' station given problems this morning with visitation and NPO status

## 2021-08-02 NOTE — PROGRESS NOTES
Rounded with Speech Therapy will keep pt NPO today  Video planned for tomorrow  Gerri Patel will reach out tomorrow AM to discuss time pending schedule and goals of care discussion

## 2021-08-02 NOTE — SPEECH THERAPY NOTE
Speech Language/Pathology  Speech/Language Pathology  Assessment    Patient Name: Stella Russell  Today's Date: 8/2/2021     Problem List  Principal Problem:    Dysphagia  Active Problems:    Narcotic dependency, continuous ([address] )    Oropharyngeal cancer ([address] )    Pulmonary lesion    Pneumonia    Anemia    Mediastinal lymphadenopathy    Hypodense mass of liver    Hypokalemia    Hypertensive urgency    Severe protein-calorie malnutrition ([address] )    Past Medical History  Past Medical History:   Diagnosis Date    Drug abuse ([address] )     Gout due to renal impairment, left wrist      Past Surgical History  Past Surgical History:   Procedure Laterality Date    CARPAL TUNNEL RELEASE      HAND FRACTURE REPAIR      KNEE CARTILAGE SURGERY         Speech-Language Pathology Bedside Swallow Evaluation      Patient Name: Stella Russell    Today's Date: 8/2/2021     Problem List  Principal Problem:    Dysphagia  Active Problems:    Narcotic dependency, continuous ([address] )    Oropharyngeal cancer ([address] )    Pulmonary lesion    Pneumonia    Anemia    Mediastinal lymphadenopathy    Hypodense mass of liver    Hypokalemia    Hypertensive urgency    Severe protein-calorie malnutrition ([address] )      Past Medical History  Past Medical History:   Diagnosis Date    Drug abuse ([address] )     Gout due to renal impairment, left wrist        Past Surgical History  Past Surgical History:   Procedure Laterality Date    CARPAL TUNNEL RELEASE      HAND FRACTURE REPAIR      KNEE CARTILAGE SURGERY         Summary   Pt presented with s/s suggestive of moderate-severe oral and suspected moderate-severe pharyngeal dysphagia  Symptoms or concerns included R oral weakness, weak retrieval, reduced oral control,  and cough response suggestive of reduced airway protection w/ all consistencies  Anterior loss of material noted from R side  Swallow initiation suspected delayed, audible "squishing" sound  Pt w/ throat clear w/ puree and HTL, cough w/ NTL, and thin liquids  Head turn L or R not effective in reducing s/s aspiration  Education provided that pt would benefit from VBS if medically appropriate to further assess swallow function and safety  Risk/s for Aspiration: High     Recommended Diet: NPO   Recommended Form of Meds: non-oral if possible   Aspiration precautions and swallowing strategies: -  Other Recommendations: Continue frequent oral care, VBS when able and appropriate         Current Medical Status  Pt is a 61 y o  male who presented to [facility]  with pneumonia/hypertensive urgency/dysphagia  Has oropharyngeal cancer diagnosed 7/16  Presented due to weakness and poor intake, has recently loss 20 lbs  Was told tumor inoperable  Per ENT, has not seen oncology  On exam frail and cachectic  Right external ear canal inflammation  Right sided pharyngeal mass with right sided cervical adenopathy  Tenderness cervical back  Right sided facial droop and generalized weakness  Procalcitonin 0 36  wbc 17 39   H&H 10 1/29  6   K 3 1  CxR showed multifocal pneumonia  Ct chest and abdomen showed ground glass opacities , mild cavitation  In the ED given 1 liter IVF and started on Rocephin  Consults to palliative care, ENT and oncology  Continue IVF and antibiotics  Replete K       Per ENT 8/1/2021:  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 and possible metastasis  No acute ENT interventions       Date: 8/2/2021   Day 2: Patient lethargic, cough yellow/brown sputum with lessening shortness of  Breath  On exam cachectic  Rales, decreased breath sounds in bases  Right facial droop  Difficulty managing secretions, suction with Yankuer, able to spit  Wbc 12 66   K 3 1  Replete K, keep NPO, IVF  Continue antibiotics       Current Precautions:  Fall  Aspiration  Contact  AIrborn  C diff   Seizure  Delirium    Allergies:  No known food allergies    Past medical history:  Please see H&P for details    Special Studies:  CXR 7/31/21: New patchy nodular infiltrates bilaterally suspicious for multifocal pneumonia  CT chest abdomen pelvis 7/31/21: Multifocal bilateral upper and lower lobe nodular groundglass airspace opacities, a few of which demonstrate mild cavitation  The findings are nonspecific with differential considerations including infectious or inflammatory process, such as covid-19   infection, versus metastatic disease  Correlation with laboratory studies is recommended      Mild mediastinal lymphadenopathy which may be reactive in nature or secondary to metastatic disease      No acute intra-abdominal abnormality  No free air  Small amount of pelvic free fluid      Moderate amount of stool noted throughout the colon  No evidence of large or small bowel obstruction      A few scattered tiny hepatic hypodensities statistically most likely representing cysts or hemangiomas  However, metastatic disease cannot be excluded  Nonemergent MRI of the abdomen is recommended for further characterization        Social/Education/Vocational Hx:  Pt lives alone    Swallow Information   Current Risks for Dysphagia & Aspiration: oropharyngeal CA  Current Symptoms/Concerns: coughing with po  Current Diet: NPO   Baseline Diet: "nutribullet drinks" w/ thin liquids       Baseline Assessment   Behavior/Cognition: alert  Speech/Language Status: able to participate in basic conversation and able to follow commands  Patient Positioning: upright in bed  Pain Status/Interventions/Response to Interventions:  No report of or nonverbal indications of pain         Swallow Mechanism Exam  Facial: right facial droop  Labial: decreased ROM right side  Lingual: right sided tongue deviation  Velum: symmetrical  Mandible: adequate ROM  Dentition: edentulous  Vocal quality:gurgly   Volitional Cough: weak   Respiratory Status: on RA    Consistencies Assessed and Performance   Consistencies Administered: thin liquids, nectar thick, honey thick and puree  Materials administered included applesauce, pt refused soft and hard solids     Oral Stage: moderate-severe  Retrieval weak, R sided weakness w/ anterior loss  Bolus formation and transfer disorganized  Suspect reduced oral control  Pharyngeal Stage: suspected mod-severe   Swallow Mechanics:  Swallowing initiation appeared prompt  Laryngeal rise was palpated and judged to be within functional limits  No coughing, throat clearing, change in vocal quality or respiratory status noted today  Esophageal Concerns: globus sensation and belching    Strategies and Efficacy: head turn L and R not effective in reducing s/s     Summary and Recommendations (see above)    Results Reviewed with: patient, RN and CRNP     Treatment Recommended: Yes     Frequency of treatment: As able     Patient Stated Goal: none stated     Dysphagia LTG  -Patient will demonstrate safe and effective oral intake (without overt s/s significant oral/pharyngeal dysphagia including s/s penetration or aspiration) for the highest appropriate diet level       Short Term Goals:  -Patient will comply with a Video/Modified Barium Swallow study for more complete assessment of swallowing anatomy/physiology/aspiration risk and to assess efficacy of treatment techniques so as to best guide treatment plan        Speech Therapy Prognosis   Prognosis: Guarded    Prognosis Considerations: age and medical status

## 2021-08-02 NOTE — PROGRESS NOTES
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  Invasion of the right right carotid space with encasement of the right internal carotid artery without luminal narrowing and occlusion of the high cervical right IJ  Tumor extends the skull base where there is localized invasion of the right petrous bone  Suggest follow-up contrast enhanced MRI of the brain and skull base to assess extent of disease  Right transverse and sigmoid sinuses are not well seen and may be hypoplastic as there appears to be dominant left-sided drainage however chronic occlusion is also possible  No evidence of acute sinus thrombosis "  · Reportedly saw ENT Aragon who said mass was inoperable   · Appreciate ENT recommendations   · oncology and palliative care consult pending    * Dysphagia  Assessment & Plan  Dysphagia Secondary to oropharyngeal mass   Keep strict NPO, IV fluids  Speech consult       Pneumonia  Assessment & Plan  · Present on admission as evidenced by cough, sputum production, Leukocytosis of 17 39K currently being treated with IV antibiotics  · Continue ceftriaxone and azithromycin #2  · Blood cultures are pending, procalcitonin elevated at 0 36, repeat pending      Hypertensive urgency  Assessment & Plan  Present on admission as evidenced by systolic blood pressure of 183/122, given dysfunction NPO status  Continue p r n   Hydralazine 10 mg every 6 hours with parameters      Severe protein-calorie malnutrition ([facility] [address] )  Assessment & Plan  Malnutrition Findings:   Adult Malnutrition type: Acute illness  Adult Degree of Malnutrition: Other severe protein calorie malnutrition     Malnutrition Characteristics: Fat loss, Muscle loss, Inadequate energy, Weight loss (PCM d/t dysphagia AEB 12 8% wt decrease x 2 weeks  Loss of muscle mass: temples depressed, clavicle clearly visible, loss in UE's,  Loss of fat mass: orbitals are dark and hollow  BMI Findings:  Adult BMI Classifications: Underweight < 18 5     Body mass index is 14 8 kg/m²  Nutrition following    Hypokalemia  Assessment & Plan  Due to reduced p o  Intake, replete p r n  Hypodense mass of liver  Assessment & Plan  · CT C/A/P: "A few scattered tiny hepatic hypodensities statistically most likely representing cysts or hemangiomas  However, metastatic disease cannot be excluded "    Mediastinal lymphadenopathy  Assessment & Plan  · CT C/A/P: "Mild mediastinal lymphadenopathy which may be reactive in nature or secondary to metastatic disease "    Anemia  Assessment & Plan  Normocytic normochromic anemia, Hgb 10 1 on presentation, now 10 9  Trend daily    Pulmonary lesion  Assessment & Plan  · CT C/A/P: "Multifocal bilateral upper and lower lobe nodular groundglass airspace opacities, a few of which demonstrate mild cavitation  Narcotic dependency, continuous (HCC)  Assessment & Plan  · Per PDMP suboxone 8-2mg film BID  · continue        VTE Pharmacologic Prophylaxis: VTE Score: 3 Moderate Risk (Score 3-4) - Pharmacological DVT Prophylaxis Ordered: enoxaparin (Lovenox)  Patient Centered Rounds: I performed bedside rounds with nursing staff today  Discussions with Specialists or Other Care Team Provider:     Education and Discussions with Family / Patient: will call family   Time Spent for Care: 30 minutes  More than 50% of total time spent on counseling and coordination of care as described above  Current Length of Stay: 1 day(s)  Current Patient Status: Inpatient   Certification Statement:  The patient will continue to require additional inpatient hospital stay due to Pneumonia, failure to thrive  Discharge Plan: Anticipate discharge in 48-72 hrs to Ongoing    Code Status: Level 3 - DNAR and DNI    Subjective:   Seen this morning  Lethargic with cough productive of yellowish/brownish sputum  Feels less short of breath, no chest pain  Objective:     Vitals:   Temp (24hrs), Av 1 °F (36 7 °C), Min:97 8 °F (36 6 °C), Max:98 4 °F (36 9 °C)    Temp:  [97 8 °F (36 6 °C)-98 4 °F (36 9 °C)] 98 °F (36 7 °C)  HR:  [69-98] 83  Resp:  [14-21] 14  BP: (138-180)/() 138/94  SpO2:  [95 %-98 %] 95 %  Body mass index is 14 8 kg/m²  Input and Output Summary (last 24 hours): Intake/Output Summary (Last 24 hours) at 2021 0710  Last data filed at 2021 0600  Gross per 24 hour   Intake 3029 99 ml   Output 1475 ml   Net 1554 99 ml       Physical Exam:   Physical Exam  Vitals and nursing note reviewed  Constitutional:       General: He is in acute distress  Appearance: He is well-developed  He is ill-appearing  Comments: Cachectic looking   HENT:      Head: Normocephalic and atraumatic  Eyes:      Conjunctiva/sclera: Conjunctivae normal    Cardiovascular:      Rate and Rhythm: Normal rate and regular rhythm  Heart sounds: No murmur heard  Pulmonary:      Effort: Pulmonary effort is normal  No respiratory distress  Breath sounds: Rales present  Comments: Breath sounds are reduced in the bases  Abdominal:      Palpations: Abdomen is soft  Tenderness: There is no abdominal tenderness  Musculoskeletal:      Cervical back: Neck supple  Right lower leg: No edema  Left lower leg: No edema  Skin:     General: Skin is warm and dry  Neurological:      General: No focal deficit present  Mental Status: He is alert and oriented to person, place, and time  Mental status is at baseline        Cranial Nerves: Cranial nerve deficit (Cranial nerve 7 palsy) present  Additional Data:     Labs:  Results from last 7 days   Lab Units 08/02/21  0443 07/31/21  2153   WBC Thousand/uL 12 66* 17 39*   HEMOGLOBIN g/dL 10 9* 10 1*   HEMATOCRIT % 33 4* 29 6*   PLATELETS Thousands/uL 190 223   BANDS PCT %  --  1   LYMPHO PCT %  --  8*   MONO PCT %  --  1*   EOS PCT %  --  0     Results from last 7 days   Lab Units 08/02/21  0443 07/31/21  2153   SODIUM mmol/L 136 133*   POTASSIUM mmol/L 3 1* 3 1*   CHLORIDE mmol/L 96* 97*   CO2 mmol/L 30 32   BUN mg/dL 21 23   CREATININE mg/dL 0 69 0 96   ANION GAP mmol/L 10 4   CALCIUM mg/dL 8 6 8 8   ALBUMIN g/dL  --  2 8*   TOTAL BILIRUBIN mg/dL  --  0 60   ALK PHOS U/L  --  66   ALT U/L  --  74   AST U/L  --  31   GLUCOSE RANDOM mg/dL 93 150*                 Results from last 7 days   Lab Units 07/31/21  2250   LACTIC ACID mmol/L 1 3   PROCALCITONIN ng/ml 0 36*       Lines/Drains:  Invasive Devices     Peripheral Intravenous Line            Peripheral IV 07/31/21 Dorsal (posterior); Right Forearm 1 day    Peripheral IV 08/01/21 Left;Ventral (anterior) Forearm 1 day                      Imaging: Reviewed radiology reports from this admission including: chest CT scan and Personally reviewed the following imaging: chest CT scan    Recent Cultures (last 7 days):   Results from last 7 days   Lab Units 07/31/21  2250   BLOOD CULTURE  Received in Microbiology Lab  Culture in Progress  Received in Microbiology Lab  Culture in Progress         Last 24 Hours Medication List:   Current Facility-Administered Medications   Medication Dose Route Frequency Provider Last Rate    azithromycin  500 mg Intravenous Q24H Guadalupe Carrero PA-C Stopped (08/02/21 0149)    buprenorphine-naloxone  4 mg Sublingual BID Guadalupe Carrero PA-C      cefTRIAXone  2,000 mg Intravenous Q24H Guadalupe Carrero PA-C Stopped (08/01/21 9396)    hydrALAZINE  10 mg Intravenous Q6H PRN Renee Barnett MD      multi-electrolyte  50 mL/hr Intravenous Continuous Jj Anders  mL/hr (08/01/21 1398)    nicotine  1 patch Transdermal Daily Roselyn Hou PA-C      ondansetron  4 mg Intravenous Q4H PRN Roselyn Hou PA-C      potassium chloride  20 mEq Intravenous Q2H KRISTEN Iraheta          Today, Patient Was Seen By: Jj Anders MD    **Please Note: This note may have been constructed using a voice recognition system  **

## 2021-08-02 NOTE — CASE MANAGEMENT
LOS: 1 day  Pt is not a documented bundle  Pt is not a 30 day readmission  Unplanned readmission score is 21 and green  Me with Pt, Pt's significant other and Pt's friend at bedside  Discussed role of case management and discharge planning  Pt's significant other informed CM that she and Pt live in 2nd floor apartment, steps to 2nd floor  Pt's PCP is Dr Lamberto Verdugo  Pt does not have POA but has living will  Pt's pharmacy is [address], able to get medications  Pt's significant other assists Pt as needed  Pt's significant other reports they are waiting for oncology to round and have Palliative Care meeting in the afternoon on 8/3  Oncology CRNP came into Pt's room at time of CM's visit  Discussed hospice services and hospice at home vs hospice in nursing home  CM informed Pt's significant other that if Pt goes into a nursing home, Pt's insurance would cover hospice services and room and board will be private pay or Pt would be MA pending for room and board and Pt's significant other would need to apply for medical assistance  CM informed Pt's significant other that [facility] does not accept Pt's insurance  Oncology CRNP reached out to Palliative Care re: Pt's status  TT received from Palliative Care requesting CM to send referral to [facility] for hospice house eval to see if Pt can be accepted  Referral sent to [facility] via [address]  LUIS to follow

## 2021-08-02 NOTE — TREATMENT PLAN
Palliative and Supportive Care - Inventory of challenges    - 1 - Acute symptom management    - Ativan - could have a role in acute anxiety management; need to be watchful for drug-drug interactions with potentially unknown other substances pt has used illicitly     = 2 98 - 0 5mg could be used in one-time doses and monitored for effect over 2hrs or more   - Haldol - potentially much more appropriate in critical care setting where we may need to reduce anxiety but preserve alterness and resp effort  Pt is monitored, and may use IV haldol while not violating medical policy for this med  = 1-2mg IV haldol is not inappropriate in this situation, q3-4hrs     - 2 - Mass and its effects   = mass is inoperable, and is occluding unilateral/ipsilateral major vessels  ? XRT could be considered urgently to help relieve obstruction of swallow  Would need Xfer of care to [facility] for urgent Rad/Onc consult  ? Spit fistula could be considered to alleviate skin injury from salivary drainage from mouth  Defer to ENT on this matter  ? Michiel Grapes could be considered non urgently to protect airway  Defer to ENT on this matter  May be of limited utility if pt chooses hospice cares  ? PEG could be considered if and only if pt will pursue life-prolonging / restorative cares  PEG may be dangerous in the community for this person with addiction, and PEG would not be useful in a comfort-cares setting     - 3 - competence and addiction   = Appears to be listening and absorbing info on exam   Has not traditionally made good decisions with controlled substances   + cocaine on admit   ? suboxone on admit  'Wife' reports that pt has not been using suboxone as Rxed at home, has a great deal of spare meds  = PDMP shows 82days of opioids in form of buprenorphine have been written for pt' in July  Most of these are from [employer], but there are other providers as well     RESOLVED - continue with suboxone orders as reported and recorded, and currently ordered  More information should be obtained from Dr Zuleika Santana  There may be utility in obtaining comprehensive drugs of abuse testing  RESOLVED - No controlled substances on D/C unless in a supervised setting     - 4 - Patient Safety   = Pt is a vulnerable adult, with addiction as noted and an inability at this time to literally speak for himself    = It is noted by care staff that pt has dubious visitors with little connection to or knowledge of patient's history, status, or even name  = Without judgment, we would be concerned that community contacts of an addicted individual might be bearers of illicit substances that should not be administered to pt in this setting, and that some community members may themselves be addicted  = We strongly suggest that hospital security/risk teams be invited to consult on this case, and consider if a restricted visitation policy or bag check or continuous observation should be deployed in this case for safety concerns

## 2021-08-02 NOTE — CONSULTS
Medical Oncology/Hematology Consult Note  Issa Eid, 61 y  o , 1965   SDU/-01 S*, [id number]  08/02/21    Assessment and Plan:    1  Concern for Advanced Oropharyngeal cancer   2  Mediastinal lymphadenopathy   3  Dysphagia  4  Goals of care      The patient is a 42-year-old gentleman with polysubstance abuse (illicit drug, alcohol and tobacco) who was 1st noted to have a large enhancing mass involving the right floor of his mouth/tongue and right oropharynx on imaging done on 07/16/2021  Tumor has spread to the right base of skull and involves the 7th and 12th cranial nerves at least   Patient has right-sided facial paralysis and significant dysphagia  Patient was seen by LACEY Francois who is part of [facility] in [facility] on 07/26/2021  He underwent a fine needle aspiration of right parotid/neck mass  Pathology has resulted with inconclusive findings  Per review of Dr Mor Mehta notes, patient's tumor is most likely a squamous cell carcinoma  Patient and his significant other were informed that the tumor is too large to be treated surgically  Dr Kendra Urena with [facility] has seen patient in consultation during this hospital stay and per his assessment, he agrees this is inoperable and likely metastatic  CT of chest abdomen and pelvis done this hospital stay shows mild mediastinal lymphadenopathy concerning for metastatic disease, a few scattered tiny hepatic hypodensities for which metastatic disease cannot be excluded and multifocal bilateral upper and lower lobe pulmonary nodules    Patient is symptomatic from a huge tumor burden and has lost a significant amount of weight resulting in malnourishment, failure to thrive    An extensive conversation was held with patient and his significant other [employer] today reviewing results of imaging studies, notes from both ENT specialists and pathology results demonstrating inconclusive findings  I explained that based on all the information that I have, it would appear that he has stage IV head and neck cancer  I explained these cancer types are typically squamous cell  Given the fact that his disease is unresectable, he is uncurable and treatment would be palliative intent to prolong life and reduce symptom burden  I also explained that patient would require an additional biopsy since his 1st biopsy results were inconclusive  I explained treatment options to include systemic therapy with combination of chemotherapy and immunotherapy, likely carboplatin, Taxol and Keytruda  Radiation could be considered verses focusing on comfort focused approach  Hospice was introduced and explained in detail    Significant amount of time was spent discussing all of this information today   Becky Shipman was present during this conversation  Time spent answering all questions to the best of my ability and patient and family stated satisfaction  Patient was able to nod his head appropriately and ask questions by writing them on paper    Patient was competent and able to write on paper that he wanted to go home and be comfortable  I addressed his code status and patient wishes to be a level 4 and focus on his comfort only  Patient was supported by Da Aguiar in this decision  Becky Shipman will work on contacting hospice agencies  I have spoken to our colleagues in palliative care and updated them on patient's transition to level 4/comfort care only  Given patient's acuity and inability to take p o  Safely he would qualify for GIP level of hospice care  I will defer to our palliative care team for symptom management and hospice transition      Please do not hesitate to contact me if you have any questions or need additional information  Thank you for this consult  Michael Batista MSN, [facility], [address] [facility]  Hematology Oncology Nurse Practitioner      Reason for Consultation:  Extensive oropharyngeal cancer    Chief Complaint   Patient presents with    Difficulty Swallowing       History of present illness:   Issa Eid is a 61 y o  male with a history of polysubstance abuse who presents with difficulty swallowing  Patient was evaluated in ED at [address] [place] [address] on 7/16/21 for shortness of breath and was noted to have symptoms of near complete right sided facial paralysis, recent weight loss  Work up included CT of neck which demonstrated a large mass centered in the right floor of mouth/tongue and oropharynx suspicious for squamous cell cancer with parapharyngeal extension and 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  Invasion of the right right carotid space with encasement of the right internal carotid artery without luminal narrowing and occlusion of the high cervical right IJ  Tumor extends the skull base where there is localized invasion of the right petrous bone  Bilateral upper lobe lung nodules, right greater than left  Some of these are cavitary and concerning for metastatic disease  Patient was discharged and recommended to follow up with ENT  He re-presented to ED on 7/31/21 with decreased oral intake, worsening SOB  Per chart review, "He did see ENT at [facility] and was told that it was and inoperable tumor and that he needed to see hematology oncology which has not been arranged as of yet"  CT chest abdomen pelvis on 7/31 shows some mild mediastinal lymphadenopathy which may be reactive or secondary to metastatic disease, multifocal bilateral upper and lower lobe nodular groundglass airspace opacities which maybe be infectious, inflammatory or metastatic  Blood work demonstrates electrolyte abnormalities consistent with malnutrition and failure to thrive   His urine was positive for cocaine      I was able to obtain outside records  Patient was seen by Dr Aj Campos ENT on 07/26/2021  He underwent needle biopsy of right parotid/neck mass on this date    Pathology from fine-needle aspiration was inconclusive      Interval history:  Patient seen and examined  He is ill-appearing, cachectic, anxious  His significant other Javi Mcneil is at bedside along with 1 of her friends  Meeting was held in the presence of  ADRIANE Cobb  Review of Systems   Constitutional: Positive for activity change, appetite change and unexpected weight change  HENT: Positive for ear pain, sore throat, trouble swallowing and voice change  Neurological: Positive for facial asymmetry and weakness  Psychiatric/Behavioral: The patient is nervous/anxious  All other systems reviewed and are negative  All other review of systems were negative      Past medical history:   Past Medical History:   Diagnosis Date    Drug abuse ([facility] [address] )     Gout due to renal impairment, left wrist        Past surgical history:   Past Surgical History:   Procedure Laterality Date    CARPAL TUNNEL RELEASE      HAND FRACTURE REPAIR      KNEE CARTILAGE SURGERY         Allergies: No Known Allergies    Home medications:   Medications Prior to Admission   Medication    buprenorphine-naloxone (SUBOXONE) 8-2 mg per SL tablet    hydrOXYzine pamoate (VISTARIL) 50 mg capsule       Hospital medications:   Current Facility-Administered Medications:     azithromycin (ZITHROMAX) 500 mg in sodium chloride 0 9% 250mL IVPB 500 mg, 500 mg, Intravenous, Q24H, Roxie Cerda PA-C, Stopped at 08/02/21 0149    buprenorphine-naloxone (Suboxone) film 4 mg, 4 mg, Sublingual, BID, Roxie Cerda PA-C, 4 mg at 08/01/21 2050    cefTRIAXone (ROCEPHIN) IVPB (premix in dextrose) 2,000 mg 50 mL, 2,000 mg, Intravenous, Q24H, Roxie Cerda PA-C, Stopped at 08/01/21 2345    enoxaparin (LOVENOX) subcutaneous injection 40 mg, 40 mg, Subcutaneous, Q24H [facility], Enrike Torrez MD    hydrALAZINE (APRESOLINE) injection 10 mg, 10 mg, Intravenous, Q6H PRN, Pam Islas MD    multi-electrolyte (PLASMALYTE-A/ISOLYTE-S PH 7 4) IV solution, 50 mL/hr, Intravenous, Continuous, Enrike Arce MD, Last Rate: 50 mL/hr at 08/02/21 0719, 50 mL/hr at 08/02/21 0719    nicotine (NICODERM CQ) 21 mg/24 hr TD 24 hr patch 1 patch, 1 patch, Transdermal, Daily, Mecca Santos PA-C, 1 patch at 08/01/21 0841    ondansetron (ZOFRAN) injection 4 mg, 4 mg, Intravenous, Q4H PRN, Mecca Santos PA-C, 4 mg at 08/01/21 0533    potassium chloride 20 mEq IVPB (premix), 20 mEq, Intravenous, Q2H, KRISTEN Krishna, Last Rate: 50 mL/hr at 08/02/21 0719, 20 mEq at 08/02/21 0719    Social history:   Social History     Tobacco Use    Smoking status: Current Every Day Smoker     Packs/day: 1 00     Years: 40 00     Pack years: 40 00    Smokeless tobacco: Never Used   Vaping Use    Vaping Use: Never assessed   Substance Use Topics    Alcohol use: Yes     Alcohol/week: 7 0 standard drinks     Types: 3 Cans of beer, 4 Shots of liquor per week     Comment: drinks 2 - 3 times /week    Drug use: No     Comment: FORMER       Family history:   Family History   Family history unknown: Yes       Vitals:  Vitals:    08/02/21 0700   BP: 138/94   Pulse: 83   Resp: 14   Temp: 98 °F (36 7 °C)   SpO2: 95%       Physical Exam  Constitutional:       General: He is not in acute distress  Appearance: He is ill-appearing  Comments: Patient is alert, ill appearing  Cachectic  Anxious  Mumbled speech  Patient appears competent and does communicate by writing with pen and paper   HENT:      Mouth/Throat:      Pharynx: Oropharyngeal exudate present  Eyes:      General: No scleral icterus  Right eye: No discharge  Left eye: No discharge  Neck:      Comments: Large mass extending from right side of his neck towards mandible  Cardiovascular:      Rate and Rhythm: Normal rate and regular rhythm  Pulmonary:      Effort: Pulmonary effort is normal  No respiratory distress  Breath sounds: No stridor     Lymphadenopathy:      Cervical: Cervical adenopathy present  Neurological:      Mental Status: He is alert and oriented to person, place, and time  Cranial Nerves: Cranial nerve deficit (Facial asymmetry) present  Comments: Right facial droop   Psychiatric:         Mood and Affect: Mood is anxious and depressed           Recent Results (from the past 48 hour(s))   CBC and differential    Collection Time: 07/31/21  9:53 PM   Result Value Ref Range    WBC 17 39 (H) 4 31 - 10 16 Thousand/uL    RBC 3 18 (L) 3 88 - 5 62 Million/uL    Hemoglobin 10 1 (L) 12 0 - 17 0 g/dL    Hematocrit 29 6 (L) 36 5 - 49 3 %    MCV 93 82 - 98 fL    MCH 31 8 26 8 - 34 3 pg    MCHC 34 1 31 4 - 37 4 g/dL    RDW 12 7 11 6 - 15 1 %    MPV 9 8 8 9 - 12 7 fL    Platelets 521 753 - 512 Thousands/uL    nRBC 0 /100 WBCs   Comprehensive metabolic panel    Collection Time: 07/31/21  9:53 PM   Result Value Ref Range    Sodium 133 (L) 136 - 145 mmol/L    Potassium 3 1 (L) 3 5 - 5 3 mmol/L    Chloride 97 (L) 100 - 108 mmol/L    CO2 32 21 - 32 mmol/L    ANION GAP 4 4 - 13 mmol/L    BUN 23 5 - 25 mg/dL    Creatinine 0 96 0 60 - 1 30 mg/dL    Glucose 150 (H) 65 - 140 mg/dL    Calcium 8 8 8 3 - 10 1 mg/dL    Corrected Calcium 9 8 8 3 - 10 1 mg/dL    AST 31 5 - 45 U/L    ALT 74 12 - 78 U/L    Alkaline Phosphatase 66 46 - 116 U/L    Total Protein 7 4 6 4 - 8 2 g/dL    Albumin 2 8 (L) 3 5 - 5 0 g/dL    Total Bilirubin 0 60 0 20 - 1 00 mg/dL    eGFR 87 ml/min/1 73sq m   Manual Differential(PHLEBS Do Not Order)    Collection Time: 07/31/21  9:53 PM   Result Value Ref Range    Segmented % 90 (H) 43 - 75 %    Bands % 1 0 - 8 %    Lymphocytes % 8 (L) 14 - 44 %    Monocytes % 1 (L) 4 - 12 %    Eosinophils, % 0 0 - 6 %    Basophils % 0 0 - 1 %    Absolute Neutrophils 15 82 (H) 1 85 - 7 62 Thousand/uL    Lymphocytes Absolute 1 39 0 60 - 4 47 Thousand/uL    Monocytes Absolute 0 17 0 00 - 1 22 Thousand/uL    Eosinophils Absolute 0 00 0 00 - 0 40 Thousand/uL    Basophils Absolute 0 00 0 00 - 0 10 Thousand/uL    Total Counted 100     RBC Morphology Present     Anisocytosis Present     Basophilic Stippling Present     Hypochromia Present     Polychromasia Present     Platelet Estimate Adequate Adequate   Blood culture #1    Collection Time: 07/31/21 10:50 PM    Specimen: Arm, Right; Blood   Result Value Ref Range    Blood Culture Received in Microbiology Lab  Culture in Progress  Blood culture #2    Collection Time: 07/31/21 10:50 PM    Specimen: Hand, Right; Blood   Result Value Ref Range    Blood Culture Received in Microbiology Lab  Culture in Progress      Lactic acid    Collection Time: 07/31/21 10:50 PM   Result Value Ref Range    LACTIC ACID 1 3 0 5 - 2 0 mmol/L   Novel Coronavirus (Covid-19),PCR [facility] - 2 Hour Stat    Collection Time: 07/31/21 10:50 PM    Specimen: Nasopharyngeal Swab; Nares   Result Value Ref Range    SARS-CoV-2 Negative Negative   Procalcitonin with AM Reflex    Collection Time: 07/31/21 10:50 PM   Result Value Ref Range    Procalcitonin 0 36 (H) <=0 25 ng/ml   Basic metabolic panel    Collection Time: 08/01/21  4:40 AM   Result Value Ref Range    Sodium 135 (L) 136 - 145 mmol/L    Potassium 4 6 3 5 - 5 3 mmol/L    Chloride 98 (L) 100 - 108 mmol/L    CO2 32 21 - 32 mmol/L    ANION GAP 5 4 - 13 mmol/L    BUN 17 5 - 25 mg/dL    Creatinine 0 63 0 60 - 1 30 mg/dL    Glucose 119 65 - 140 mg/dL    Calcium 8 5 8 3 - 10 1 mg/dL    eGFR 109 ml/min/1 73sq m   CBC (With Platelets)    Collection Time: 08/01/21  4:40 AM   Result Value Ref Range    WBC 15 68 (H) 4 31 - 10 16 Thousand/uL    RBC 3 39 (L) 3 88 - 5 62 Million/uL    Hemoglobin 10 6 (L) 12 0 - 17 0 g/dL    Hematocrit 31 9 (L) 36 5 - 49 3 %    MCV 94 82 - 98 fL    MCH 31 3 26 8 - 34 3 pg    MCHC 33 2 31 4 - 37 4 g/dL    RDW 12 8 11 6 - 15 1 %    Platelets 327 652 - 274 Thousands/uL    MPV 11 9 8 9 - 12 7 fL   Magnesium    Collection Time: 08/01/21  6:18 AM   Result Value Ref Range    Magnesium 1 7 1 6 - 2 6 mg/dL   Basic metabolic panel    Collection Time: 08/01/21  6:18 AM   Result Value Ref Range    Sodium 137 136 - 145 mmol/L    Potassium 3 2 (L) 3 5 - 5 3 mmol/L    Chloride 98 (L) 100 - 108 mmol/L    CO2 33 (H) 21 - 32 mmol/L    ANION GAP 6 4 - 13 mmol/L    BUN 18 5 - 25 mg/dL    Creatinine 0 78 0 60 - 1 30 mg/dL    Glucose 123 65 - 140 mg/dL    Calcium 8 8 8 3 - 10 1 mg/dL    eGFR 100 ml/min/1 73sq m   Rapid drug screen, urine    Collection Time: 08/01/21  7:42 AM   Result Value Ref Range    Amph/Meth UR Negative Negative    Barbiturate Ur Negative Negative    Benzodiazepine Urine Negative Negative    Cocaine Urine Positive (A) Negative    Methadone Urine Negative Negative    Opiate Urine Negative Negative    PCP Ur Negative Negative    THC Urine Negative Negative    Oxycodone Urine Negative Negative   CBC    Collection Time: 08/02/21  4:43 AM   Result Value Ref Range    WBC 12 66 (H) 4 31 - 10 16 Thousand/uL    RBC 3 51 (L) 3 88 - 5 62 Million/uL    Hemoglobin 10 9 (L) 12 0 - 17 0 g/dL    Hematocrit 33 4 (L) 36 5 - 49 3 %    MCV 95 82 - 98 fL    MCH 31 1 26 8 - 34 3 pg    MCHC 32 6 31 4 - 37 4 g/dL    RDW 13 0 11 6 - 15 1 %    Platelets 742 983 - 750 Thousands/uL    MPV 11 7 8 9 - 12 7 fL   Basic metabolic panel    Collection Time: 08/02/21  4:43 AM   Result Value Ref Range    Sodium 136 136 - 145 mmol/L    Potassium 3 1 (L) 3 5 - 5 3 mmol/L    Chloride 96 (L) 100 - 108 mmol/L    CO2 30 21 - 32 mmol/L    ANION GAP 10 4 - 13 mmol/L    BUN 21 5 - 25 mg/dL    Creatinine 0 69 0 60 - 1 30 mg/dL    Glucose 93 65 - 140 mg/dL    Calcium 8 6 8 3 - 10 1 mg/dL    eGFR 105 ml/min/1 73sq m       Imaging Studies:   XR chest 1 view portable    Result Date: 8/1/2021  Narrative: CHEST INDICATION:   SOB  COMPARISON:  Chest x-ray 7/16/2021 EXAM PERFORMED/VIEWS:  XR CHEST PORTABLE FINDINGS: Cardiomediastinal silhouette appears unremarkable  New patchy nodular infiltrates bilaterally suspicious for multifocal pneumonia  No pneumothorax or pleural effusion  Osseous structures appear within normal limits for patient age  Impression: New patchy nodular infiltrates bilaterally suspicious for multifocal pneumonia  Findings concur with the preliminary ER interpretation  Workstation performed: [id number]     XR chest pa & lateral    Result Date: 7/16/2021  Narrative: CHEST INDICATION:   sob  COMPARISON:  None EXAM PERFORMED/VIEWS:  XR CHEST PA & LATERAL Images: 5 FINDINGS: Cardiomediastinal silhouette appears unremarkable  The lungs are clear  No pneumothorax or pleural effusion  Osseous structures appear within normal limits for patient age  Impression: No acute cardiopulmonary disease  Workstation performed: [id number]     CT soft tissue neck with contrast    Result Date: 7/16/2021  Narrative: CT NECK WITH CONTRAST INDICATION:   Neck mass, solitary, afebrile (Age => 14y) R neck mass  COMPARISON:  None  TECHNIQUE:  Axial, sagittal, and coronal 2D reformatted images were created from the axial source data and submitted for interpretation  Radiation dose length product (DLP) for this visit:  436 mGy-cm   This examination, like all CT scans performed in the [facility], was performed utilizing techniques to minimize radiation dose exposure, including the use of iterative reconstruction and automated exposure control  IV Contrast:  85 mL of iohexol (OMNIPAQUE) IMAGE QUALITY:  Some of the images are degraded by motion  FINDINGS: VISUALIZED BRAIN PARENCHYMA:  No acute intracranial pathology of the visualized brain parenchyma  VISUALIZED ORBITS AND PARANASAL SINUSES: Orbits are unremarkable  Minimal mucosal thickening and tiny retention cyst  NASAL CAVITY AND NASOPHARYNX:  Normal  SUPRAHYOID NECK: There is a large enhancing mass involving the right floor of mouth/tongue and right oropharynx that measures approximately 5 9 x 4 8 cm (image 45 series 2 x 3 9 cm on image 74 series 602  Mass extends just to the left of midline the floor of mouth/tongue   Tumor extends into the right parapharyngeal space and is contiguous with large right level 2 eugenio conglomerate that measures approximately 3 2 x 4 8 cm on image 48 of series 2  There is also involvement of the right carotid space with circumferential encasement of the proximal common carotid artery , occlusion of the upper cervical IJ extension of tumor of the skull base with localized invasion of the petrous bone  Mass abuts the lingual surface of the right mandible without osseous destruction  No definite osseous destruction but evaluation is limited due to motion  INFRAHYOID NECK:  Aryepiglottic folds and piriform sinuses are normal   Normal glottis and subglottic airway  Small tracheal diverticulum at the posterior right aspect of the trachea at the level of T1  THYROID GLAND:  Unremarkable  PAROTID AND SUBMANDIBULAR GLANDS: Mass inseparable from the tail of the right parotid gland  Left parotid gland is unremarkable  Evaluation of the submandibular glands is somewhat limited due to motion  Right submandibular gland difficult to discern right from adjacent tumor  LYMPH NODES:  Large level 2 eugenio mass on the right inseparable from the mass as described above  Right level 1 adenopathy  1 3 cm left level 2 adenopathy  VASCULAR STRUCTURES: Right IJ is occluded at and above the level of the mass  Right IJ is patent below the level of the mass  Encasement of the right internal carotid artery without luminal narrowing as described above  Intracranially, the right transverse and sigmoid sinuses are severely attenuated and may be hypoplastic although chronic occlusion is is possible    THORACIC INLET: Multiple groundglass nodular opacities in the right upper lobe with additional subcentimeter cavitary and noncavitary nodules measuring up to 8 mm  Few small nodules in the left upper lobe measuring 4 mm or less  Recommend chest CT for further evaluation  BONY STRUCTURES: Localized skull base invasion as described above  Impression: Large mass centered in the right floor of mouth/tongue and oropharynx suspicious for squamous cell cancer with parapharyngeal extension and 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  Invasion of the right right carotid space with encasement of the right internal carotid artery without luminal narrowing and occlusion of the high cervical right IJ  Tumor extends the skull base where there is localized invasion of the right petrous bone  Suggest follow-up contrast enhanced MRI of the brain and skull base to assess extent of disease  Right transverse and sigmoid sinuses are not well seen and may be hypoplastic as there appears to be dominant left-sided drainage however chronic occlusion is also possible  No evidence of acute sinus thrombosis  Bilateral upper lobe lung nodules, right greater than left  Some of these are cavitary and concerning for metastatic disease  Additional groundglass nodules in the right upper lobe may be infectious/inflammatory  Recommend chest CT for further evaluation  I personally discussed this study with Yusra Hernandez on 7/16/2021 at 12:45 PM  Workstation performed: [id number]     CT chest abdomen pelvis w contrast    Result Date: 8/1/2021  Narrative: CT CHEST, ABDOMEN AND PELVIS WITH IV CONTRAST INDICATION:   Neoplasm: extra-abdominal primary Metastatic cancer  COMPARISON:  None  TECHNIQUE: CT examination of the chest, abdomen and pelvis was performed  Axial, sagittal, and coronal 2D reformatted images were created from the source data and submitted for interpretation  Radiation dose length product (DLP) for this visit:  664 12 mGy-cm   This examination, like all CT scans performed in the [facility], was performed utilizing techniques to minimize radiation dose exposure, including the use of iterative  reconstruction and automated exposure control  IV Contrast:  90 mL of iohexol (OMNIPAQUE) Enteric Contrast: Enteric contrast was not administered   FINDINGS: CHEST LUNGS:  There are multifocal round glass nodular opacities noted throughout both upper and lower lobes some of which demonstrate minimal associated cavitation  Differential considerations include infectious or inflammatory process versus metastatic disease  There is no pleural effusion or pneumothorax  There is no tracheal or endobronchial lesion  PLEURA:  Unremarkable  HEART/GREAT VESSELS:  Unremarkable for patient's age  MEDIASTINUM AND ALEX:  There is an enlarged AP window lymph node measuring 1 3 cm (series 2, image 34)  There is an enlarged prevascular lymph node measuring 1 2 cm  CHEST WALL AND LOWER NECK:   Unremarkable  ABDOMEN LIVER/BILIARY TREE:  There are a few scattered tiny hepatic hypodensities showing up to 5 mm which may represent small cysts or hemangiomas versus metastatic disease  No intrahepatic biliary ductal dilatation  No portal vein thrombosis  GALLBLADDER:  No calcified gallstones  No pericholecystic inflammatory change  SPLEEN:  Unremarkable  PANCREAS:  Unremarkable  ADRENAL GLANDS:  Unremarkable  KIDNEYS/URETERS:  Unremarkable  No hydronephrosis  STOMACH AND BOWEL:  A moderate amount of stool is noted throughout colon  Is no evidence of large or small bowel obstruction  APPENDIX:  No findings to suggest appendicitis  ABDOMINOPELVIC CAVITY:  There is a small amount of pelvic free fluid  No pneumoperitoneum  No lymphadenopathy  VESSELS:  Unremarkable for patient's age  PELVIS REPRODUCTIVE ORGANS:  Unremarkable for patient's age  URINARY BLADDER:  Unremarkable  ABDOMINAL WALL/INGUINAL REGIONS:  Unremarkable  OSSEOUS STRUCTURES:  No acute fracture or destructive osseous lesion  Impression: Multifocal bilateral upper and lower lobe nodular groundglass airspace opacities, a few of which demonstrate mild cavitation  The findings are nonspecific with differential considerations including infectious or inflammatory process, such as covid-19 infection, versus metastatic disease  Correlation with laboratory studies is recommended  Mild mediastinal lymphadenopathy which may be reactive in nature or secondary to metastatic disease  No acute intra-abdominal abnormality  No free air  Small amount of pelvic free fluid  Moderate amount of stool noted throughout the colon  No evidence of large or small bowel obstruction  A few scattered tiny hepatic hypodensities statistically most likely representing cysts or hemangiomas  However, metastatic disease cannot be excluded  Nonemergent MRI of the abdomen is recommended for further characterization  Workstation performed: [id number]       Counseling / Coordination of Care  Total floor / unit time spent today 120+ minutes  Greater than 50% of total time was spent with the patient and / or family counseling and / or coordination of care  A description of the counseling / coordination of care:  Chart review, time spent acquired records from outside ENT, discussion with primary medical team, discussion with palliative care, discussion with bedside nursing, discussion with Case Management, bedside assessment of patient and discussion regarding imaging concern for stage IV head and neck cancer  Treatment options were discussed including systemic chemotherapy, need to re-biopsy, verses pursuing comfort care approach  Significant amount of time was spent discussing hospice and hospice services  Goals of care were addressed and code status was changed to a level 4 based on patient's wishes  Time then spent coordinating care with Case Management to arrange for appropriate hospice consultation given patient's social issues and drug and alcohol abuse    KRISTEN Pollard    Please note: This report has been generated by voice recognition software system  Therefore, there may be syntax, spelling and/or grammatical errors  Please call if you have any questions

## 2021-08-02 NOTE — ASSESSMENT & PLAN NOTE
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  Invasion of the right right carotid space with encasement of the right internal carotid artery without luminal narrowing and occlusion of the high cervical right IJ  Tumor extends the skull base where there is localized invasion of the right petrous bone  Suggest follow-up contrast enhanced MRI of the brain and skull base to assess extent of disease  Right transverse and sigmoid sinuses are not well seen and may be hypoplastic as there appears to be dominant left-sided drainage however chronic occlusion is also possible   No evidence of acute sinus thrombosis "  · Reportedly saw ENT [facility] who said mass was inoperable   · Appreciate ENT recommendations   · oncology and palliative care consult pending

## 2021-08-02 NOTE — PROGRESS NOTES
Jovanna Alva visitor continually requesting pain  Medication for him  Taylors Fallsdaisy Carl complains of no pain as documented in flow sheets

## 2021-08-02 NOTE — UTILIZATION REVIEW
Initial Clinical Review    Admission: Date/Time/Statement:   Admission Orders (From admission, onward)     Ordered        08/01/21 0028  INPATIENT ADMISSION  Once                   Orders Placed This Encounter   Procedures    INPATIENT ADMISSION     Standing Status:   Standing     Number of Occurrences:   1     Order Specific Question:   Level of Care     Answer:   Med Surg [16]     Order Specific Question:   Estimated length of stay     Answer:   More than 2 Midnights     Order Specific Question:   Certification     Answer:   I certify that inpatient services are medically necessary for this patient for a duration of greater than two midnights  See H&P and MD Progress Notes for additional information about the patient's course of treatment  ED Arrival Information     Expected Arrival Acuity    - 7/31/2021 21:07 Urgent         Means of arrival Escorted by Service Admission type    Wheelchair Friend General Medicine Urgent         Arrival complaint    sob  chills fever        Chief Complaint   Patient presents with    Difficulty Swallowing       Initial Presentation: This is a 61year old male from home to ED on 7/31/2021 and inpatient order placed 8/1/2021 due to pneumonia/hypertensive urgency/dysphagia  Has oropharyngeal cancer diagnosed 7/16  Presented due to weakness and poor intake, has recently loss 20 lbs  Was told tumor inoperable  Per ENT, has not seen oncology  On exam frail and cachectic  Right external ear canal inflammation  Right sided pharyngeal mass with right sided cervical adenopathy  Tenderness cervical back  Right sided facial droop and generalized weakness  Procalcitonin 0 36  wbc 17 39   H&H 10 1/29  6   K 3 1  CxR showed multifocal pneumonia  Ct chest and abdomen showed ground glass opacities , mild cavitation  In the ED given 1 liter IVF and started on Rocephin  Consults to palliative care, ENT and oncology  Continue IVF and antibiotics  Replete K       Per ENT 8/1/2021: 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 and possible metastasis  No acute ENT interventions  Date: 8/2/2021   Day 2: Patient lethargic, cough yellow/brown sputum with lessening shortness of  Breath  On exam cachectic  Rales, decreased breath sounds in bases  Right facial droop  Difficulty managing secretions, suction with Yankuer, able to spit  Wbc 12 66   K 3 1  Replete K, keep NPO, IVF  Continue antibiotics  Per oncology 8/2/2021:  Patient with suspected advanced oropharyngeal cancer, mediastinal lymphadenopathy and dysphagia  Discussed with patient and significant other that concern for stage IV head and neck cancer, incurable, treatment palliative and would need biopsy, treatment options of chemo with immunotherapy, possible radiation  Discussed hospice  Patient decided he wanted to be level 4 code, comfort care  Care management to refer to hospice agencies       ED Triage Vitals [07/31/21 2118]   Temperature Pulse Respirations Blood Pressure SpO2   99 °F (37 2 °C) 69 17 168/98 96 %      Temp Source Heart Rate Source Patient Position - Orthostatic VS BP Location FiO2 (%)   Oral Monitor Sitting Left arm --      Pain Score       8          Wt Readings from Last 1 Encounters:   08/01/21 49 5 kg (109 lb 2 oz)     Additional Vital Signs:   /02/21 1100  98 °F (36 7 °C)  67  19  153/98  121  94 %  28  2 L/min  Nasal cannula  --   08/02/21 0700  98 °F (36 7 °C)  83  14  138/94  112  95 %  28  2 L/min  Nasal cannula    Lying   O2 Device: comfort at 08/02/21 0700   08/02/21 0549  97 8 °F (36 6 °C)  69  18  146/95  115  98 %  28  2 L/min  Nasal cannula         08/01/21 1200  --  98  20  180/106Abnormal   135  98 %  --  --  --  --   08/01/21 0800  --  98  20  --  --  95 %  28  2 L/min  Nasal cannula  --   08/01/21 0700  --  91  25Abnormal   159/109Abnormal   130  95 %  --  --  --  --   08/01/21 0645  98 2 °F (36 8 °C)  83  23Abnormal   173/110Abnormal   136  96 %  -- --  None (Room air)  --   08/01/21 0400  --  76  23Abnormal   177/114Abnormal   139  98 %  --  --  --  --   08/01/21 01:12:53  99 °F (37 2 °C)  87  20  165/111Abnormal   129  94 %  --  --  --  --   07/31/21 2330  --  113Abnormal   21  161/116Abnormal   135  96 %               Pertinent Labs/Diagnostic Test Results:   7/31/2021 CxR - New patchy nodular infiltrates bilaterally suspicious for multifocal pneumonia  7/31/2021 ct chest/abdomen Multifocal bilateral upper and lower lobe nodular groundglass airspace opacities, a few of which demonstrate mild cavitation  The findings are nonspecific with differential considerations including infectious or inflammatory process, such as covid-19 infection, versus metastatic disease  Correlation with laboratory studies is recommended    Mild mediastinal lymphadenopathy which may be reactive in nature or secondary to metastatic disease    No acute intra-abdominal abnormality  No free air  Small amount of pelvic free fluid    Moderate amount of stool noted throughout the colon  No evidence of large or small bowel obstruction    A few scattered tiny hepatic hypodensities statistically most likely representing cysts or hemangiomas  However, metastatic disease cannot be excluded    Results from last 7 days   Lab Units 07/31/21  2250   SARS-COV-2  Negative     Results from last 7 days   Lab Units 08/02/21 0443 08/01/21 0440 07/31/21  2153   WBC Thousand/uL 12 66* 15 68* 17 39*   HEMOGLOBIN g/dL 10 9* 10 6* 10 1*   HEMATOCRIT % 33 4* 31 9* 29 6*   PLATELETS Thousands/uL 190 153 223   BANDS PCT %  --   --  1     Results from last 7 days   Lab Units 08/02/21 0443 08/01/21  0618 08/01/21  0440 07/31/21  2153   SODIUM mmol/L 136 137 135* 133*   POTASSIUM mmol/L 3 1* 3 2* 4 6 3 1*   CHLORIDE mmol/L 96* 98* 98* 97*   CO2 mmol/L 30 33* 32 32   ANION GAP mmol/L 10 6 5 4   BUN mg/dL 21 18 17 23   CREATININE mg/dL 0 69 0 78 0 63 0 96   EGFR ml/min/1 73sq m 105 100 109 87   CALCIUM mg/dL 8 6 8 8 8 5 8 8   MAGNESIUM mg/dL  --  1 7  --   --      Results from last 7 days   Lab Units 07/31/21  2153   AST U/L 31   ALT U/L 74   ALK PHOS U/L 66   TOTAL PROTEIN g/dL 7 4   ALBUMIN g/dL 2 8*   TOTAL BILIRUBIN mg/dL 0 60     Results from last 7 days   Lab Units 08/02/21  0443 08/01/21  0618 08/01/21  0440 07/31/21  2153   GLUCOSE RANDOM mg/dL 93 123 119 150*     Results from last 7 days   Lab Units 08/02/21  0443 07/31/21  2250   PROCALCITONIN ng/ml 0 13 0 36*     Results from last 7 days   Lab Units 07/31/21  2250   LACTIC ACID mmol/L 1 3     Results from last 7 days   Lab Units 08/01/21  0742   AMPH/METH  Negative   BARBITURATE UR  Negative   BENZODIAZEPINE UR  Negative   COCAINE UR  Positive*   METHADONE URINE  Negative   OPIATE UR  Negative   PCP UR  Negative   THC UR  Negative     Results from last 7 days   Lab Units 07/31/21  2250   BLOOD CULTURE  No Growth at 24 hrs  No Growth at 24 hrs       Results from last 7 days   Lab Units 07/31/21  2153   TOTAL COUNTED  100       ED Treatment:   Medication Administration from 07/31/2021 2106 to 08/01/2021 0106       Date/Time Order Dose Route Action Comments     07/31/2021 2153 sodium chloride 0 9 % bolus 1,000 mL 1,000 mL Intravenous New Bag      07/31/2021 2255 cefTRIAXone (ROCEPHIN) IVPB (premix in dextrose) 2,000 mg 50 mL 2,000 mg Intravenous New Bag      07/31/2021 2313 potassium chloride 20 mEq IVPB (premix) 20 mEq Intravenous New Bag      07/31/2021 2336 ondansetron (ZOFRAN) injection 4 mg 4 mg Intravenous Given         Past Medical History:   Diagnosis Date    Drug abuse ([facility] )     Gout due to renal impairment, left wrist      Present on Admission:   [facility]arcotic dependency, [facility] )   Pneumonia      Admitting Diagnosis: Dehydration [E86 0]  Difficulty in swallowing [R13 10]  Hypokalemia [E87 6]  Pharyngeal cancer ([facility] ) [C14 0]  Pulmonary infiltrates [R91 8]  Metastatic disease ([facility] ) [C79 9]  Age/Sex: 61 y o  male  Admission Orders: 8/1/2021 0028 inpatient   Scheduled Medications:  azithromycin, 500 mg, Intravenous, Q24H  buprenorphine-naloxone, 4 mg, Sublingual, BID  cefTRIAXone, 2,000 mg, Intravenous, Q24H  enoxaparin, 40 mg, Subcutaneous, Q24H EARLINE  nicotine, 1 patch, Transdermal, Daily  potassium chloride, 20 mEq, Intravenous, Q2H - given one dose on 7/31,  X 2 on 8/1, x 3 on 8/2     hydrALAZINE (APRESOLINE) injection 5 mg   Dose: 5 mg  Freq: Once Route: IV  Start: 08/01/21 0500 End: 08/01/21 0459    LORazepam (ATIVAN) injection 0 5 mg   Dose: 0 5 mg  Freq: Once Route: IV  Start: 08/01/21 2115 End: 08/01/21 2113    magnesium sulfate IVPB (premix) SOLN 1 g   Dose: 1 g  Freq: Once Route: IV  Last Dose: Stopped (08/01/21 6059)  Start: 08/01/21 0715 End: 08/01/21 0943    Continuous IV Infusions:  multi-electrolyte, 50 mL/hr, Intravenous, Continuous    PRN Meds:  hydrALAZINE, 10 mg, Intravenous, Q6H PRN  LORazepam, 0 25 mg, Intravenous, Q6H PRN - used x 1 8/2   ondansetron, 4 mg, Intravenous, Q4H PRN - used x 1 8/1    hydrALAZINE (APRESOLINE) injection 5 mg - used x 2 8/1, x 1 8/2  Dose: 5 mg  Freq: Every 6 hours PRN Route: IV  PRN Reason: high blood pressure  PRN Comment: SBP >170 mmHg or SBP >110 mmHg  Start: 08/01/21 0526 End: 08/02/21 0706    NPO    IP CONSULT TO PALLIATIVE CARE  IP CONSULT TO ENT  IP CONSULT TO ONCOLOGY    Network Utilization Review Department  ATTENTION: Please call with any questions or concerns to [phone] and carefully listen to the prompts so that you are directed to the right person  All voicemails are confidential   Usman Aguilar all requests for admission clinical reviews, approved or denied determinations and any other requests to dedicated fax number below belonging to the campus where the patient is receiving treatment   List of dedicated fax numbers for the Facilities:  FACILITY NAME UR FAX NUMBER   ADMISSION DENIALS (Administrative/Medical Necessity) [phone]   [address] (Maternity/NICU/Pediatrics) [address] [facility],7Th Floor [address] [place] [address] [facility] [address] [id number] [place] [address] [address] Degroot [facility] [address] [place] [address] [phone]

## 2021-08-02 NOTE — PROGRESS NOTES
Family stating they have not seen anyone today from medical team  Dr Lim Running rounded at 4836 with myself  Keo Pink with Palliative medicine rounded bedside with myself and Sebas's family x2 this morning

## 2021-08-02 NOTE — ASSESSMENT & PLAN NOTE
· CT C/A/P: "Multifocal bilateral upper and lower lobe nodular groundglass airspace opacities, a few of which demonstrate mild cavitation

## 2021-08-02 NOTE — ASSESSMENT & PLAN NOTE
· Present on admission as evidenced by cough, sputum production, Leukocytosis of 17 39K currently being treated with IV antibiotics  · Continue ceftriaxone and azithromycin #2  · Blood cultures are pending, procalcitonin elevated at 0 36, repeat pending

## 2021-08-02 NOTE — ASSESSMENT & PLAN NOTE
Malnutrition Findings:   Adult Malnutrition type: Acute illness  Adult Degree of Malnutrition: Other severe protein calorie malnutrition     Malnutrition Characteristics: Fat loss, Muscle loss, Inadequate energy, Weight loss (PCM d/t dysphagia AEB 12 8% wt decrease x 2 weeks  Loss of muscle mass: temples depressed, clavicle clearly visible, loss in UE's,  Loss of fat mass: orbitals are dark and hollow  BMI Findings:  Adult BMI Classifications: Underweight < 18 5     Body mass index is 14 8 kg/m²       Nutrition following

## 2021-08-03 PROCEDURE — 99232 SBSQ HOSP IP/OBS MODERATE 35: CPT | Performed by: PHYSICIAN ASSISTANT

## 2021-08-03 RX ORDER — LORAZEPAM 2 MG/ML
0.5 INJECTION INTRAMUSCULAR EVERY 4 HOURS PRN
Status: DISCONTINUED | OUTPATIENT
Start: 2021-08-03 | End: 2021-08-03

## 2021-08-03 RX ORDER — LORAZEPAM 2 MG/ML
0.5 INJECTION INTRAMUSCULAR EVERY 2 HOUR PRN
Status: DISCONTINUED | OUTPATIENT
Start: 2021-08-03 | End: 2021-08-04 | Stop reason: HOSPADM

## 2021-08-03 RX ADMIN — MORPHINE SULFATE 5 MG: 10 INJECTION INTRAVENOUS at 14:24

## 2021-08-03 RX ADMIN — BUPRENORPHINE AND NALOXONE 4 MG: 2; .5 FILM BUCCAL; SUBLINGUAL at 08:21

## 2021-08-03 RX ADMIN — LORAZEPAM 0.5 MG: 2 INJECTION INTRAMUSCULAR; INTRAVENOUS at 19:33

## 2021-08-03 RX ADMIN — MORPHINE SULFATE 5 MG: 10 INJECTION INTRAVENOUS at 19:34

## 2021-08-03 RX ADMIN — HALOPERIDOL LACTATE 2 MG: 5 INJECTION, SOLUTION INTRAMUSCULAR at 15:21

## 2021-08-03 RX ADMIN — LORAZEPAM 0.5 MG: 2 INJECTION INTRAMUSCULAR; INTRAVENOUS at 09:51

## 2021-08-03 RX ADMIN — MORPHINE SULFATE 5 MG: 10 INJECTION INTRAVENOUS at 05:56

## 2021-08-03 RX ADMIN — MORPHINE SULFATE 5 MG: 10 INJECTION INTRAVENOUS at 17:37

## 2021-08-03 RX ADMIN — BUPRENORPHINE AND NALOXONE 4 MG: 2; .5 FILM BUCCAL; SUBLINGUAL at 21:29

## 2021-08-03 RX ADMIN — MORPHINE SULFATE 5 MG: 10 INJECTION INTRAVENOUS at 12:41

## 2021-08-03 RX ADMIN — MORPHINE SULFATE 5 MG: 10 INJECTION INTRAVENOUS at 23:55

## 2021-08-03 RX ADMIN — NICOTINE 1 PATCH: 21 PATCH, EXTENDED RELEASE TRANSDERMAL at 08:22

## 2021-08-03 NOTE — CASE MANAGEMENT
Continue to follow  JOSE SIMMS [facility] cannot accept Pt due at capacity  Referral sent to [facility] via [address]  [facility] cannot accept Pt for inpt hopsice  Call placed to Pt's significant other(Radha: [phone]), informed Ana Lilia Lopez that [facility] cannot accept Pt for hospice due to unit is at capacity  CM informed Pt's significant other that [facility] cannot accept Pt  [place] of Choice given for alternative hospice unit  Pt's significant other is choosing referral to [facility] [place]  Call placed to [facility]), spoke with Roselyn Coyle, faxed requested information to [phone]  CM to follow

## 2021-08-03 NOTE — ASSESSMENT & PLAN NOTE
Malnutrition Findings:   Adult Malnutrition type: Acute illness  Adult Degree of Malnutrition: Other severe protein calorie malnutrition   · Malnutrition Characteristics: Fat loss, Muscle loss, Inadequate energy, Weight loss (PCM d/t dysphagia AEB 12 8% wt decrease x 2 weeks  Loss of muscle mass: temples depressed, clavicle clearly visible, loss in UE's,  Loss of fat mass: orbitals are dark and hollow  BMI Findings:  Adult BMI Classifications: Underweight < 18 5  Body mass index is 14 8 kg/m²    · Nutrition following

## 2021-08-03 NOTE — ASSESSMENT & PLAN NOTE
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  Invasion of the right right carotid space with encasement of the right internal carotid artery without luminal narrowing and occlusion of the high cervical right IJ  Tumor extends the skull base where there is localized invasion of the right petrous bone  Suggest follow-up contrast enhanced MRI of the brain and skull base to assess extent of disease  Right transverse and sigmoid sinuses are not well seen and may be hypoplastic as there appears to be dominant left-sided drainage however chronic occlusion is also possible  No evidence of acute sinus thrombosis "  · CT A/P shows mild mediastinal lymphadenopathy concerning for metastatic disease, a few scattered tiny hepatic hypodensities for which metastatic disease cannot be excluded and multifocal bilateral upper and lower lobe pulmonary nodules   · Reportedly eval by ENT [place] and [place] - mass unfortunately inoperable  Prior bx inconclusive but likely SCC    · Oncology and palliative care consults appreciated   · Plan to transition to IP hospice, referral placed   · Continue CMO   · Add pleasure feeds   · Symptom management

## 2021-08-03 NOTE — PROGRESS NOTES
Pastoral Care Progress Note    8/3/2021  Patient: Nava Flow : 1965  Admission Date & Time: 2021  MRN: [id number] [facility]: [id number]                     Chaplaincy Interventions Utilized:         Chaplaincy Outcomes Achieved:  Declined  support     21 1300   Clinical Encounter Type   Visited With Patient   Routine Visit Introduction   Referral To   (census/rounds)

## 2021-08-03 NOTE — ASSESSMENT & PLAN NOTE
· CT C/A/P: "Mild mediastinal lymphadenopathy which may be reactive in nature or secondary to metastatic disease "  · Likely represents metastatic dz

## 2021-08-03 NOTE — QUICK NOTE
Spoke to patient's significant other Sahra Calderon who he has named as [facility]  She reports she is struggling with patients decision for hospice but "I know it is the right thing "  She is very tearful  She reports being clean and sober for the past year and is fearful that this will cause her to use again  She reports good support from her sponsor who has been present at the hospital   She is also faithful and will reach out to her spiritual team   She reports she went and worked with a  home today to begin preparations for [employer]    She does not want to tell him this  She is finding comfort in the fact that he is now permitted to eat and drink anything he wants as this has made him smile  Reviewed patient's medication regimen with her as well dosing schedule and encouraged her in assisting him in requesting as needed  She reports that the morphine does bring him relief with current dosing when he asks for it  Also discussed the purpose of ativan and haldol  She plans to be with patient as much as possible    Encouraged calls to Palliative Medicine if needed

## 2021-08-03 NOTE — UTILIZATION REVIEW
Inpatient Admission Authorization Request   NOTIFICATION OF INPATIENT ADMISSION/INPATIENT AUTHORIZATION REQUEST   SERVICING FACILITY:   [place] [address]  Tax ID: [id number]  NPI: [id number]  Place of Service: Inpatient [address] [facility]  60  Place of Service Code: 24     ATTENDING PROVIDER:  Attending Name and NPI#: Bartolome Cifuentes Md [de-[id number]]  Address: [address] [place] [address]  Phone: [phone]     UTILIZATION REVIEW CONTACT:  Oleksandr Allison Utilization   Network Utilization Review Department  Phone: [phone]  Fax [phone]  Email: [email or web address]     PHYSICIAN ADVISORY SERVICES:  FOR [id number] REVIEW - MEDICAL NECESSITY DENIAL  Phone: [phone]  Fax: [phone]  Email: [email or web address]     TYPE OF REQUEST:  Inpatient Status     ADMISSION INFORMATION:  ADMISSION DATE/TIME: 8/1/21 12:28 AM  PATIENT DIAGNOSIS CODE/DESCRIPTION:  Dehydration [E86 0]  Difficulty in swallowing [R13 10]  Hypokalemia [E87 6]  Pharyngeal cancer (HCC) [C14 0]  Pulmonary infiltrates [R91 8]  Metastatic disease ([address] ) [C79 9]  DISCHARGE DATE/TIME: No discharge date for patient encounter  DISCHARGE DISPOSITION (IF DISCHARGED): Home/Self Care     IMPORTANT INFORMATION:  Please contact the Eliza Singh directly with any questions or concerns regarding this request  Department voicemails are confidential     Send requests for admission clinical reviews, concurrent reviews, approvals, and administrative denials due to lack of clinical to fax [phone]

## 2021-08-03 NOTE — PROGRESS NOTES
[place]  Progress Note - Get Varner 1965, 61 y o  male MRN: [id number]  Unit/Bed#: -01 Encounter: [id number]  Primary Care Provider: Lamberto Verdugo DO   Date and time admitted to hospital: 7/31/2021  9:12 PM    Oropharyngeal cancer ([facility] [address] )  Assessment & Plan  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  Invasion of the right right carotid space with encasement of the right internal carotid artery without luminal narrowing and occlusion of the high cervical right IJ  Tumor extends the skull base where there is localized invasion of the right petrous bone  Suggest follow-up contrast enhanced MRI of the brain and skull base to assess extent of disease  Right transverse and sigmoid sinuses are not well seen and may be hypoplastic as there appears to be dominant left-sided drainage however chronic occlusion is also possible  No evidence of acute sinus thrombosis "  · CT A/P shows mild mediastinal lymphadenopathy concerning for metastatic disease, a few scattered tiny hepatic hypodensities for which metastatic disease cannot be excluded and multifocal bilateral upper and lower lobe pulmonary nodules   · Reportedly eval by ENT [place] and [facility] - mass unfortunately inoperable  Prior bx inconclusive but likely SCC    · Oncology and palliative care consults appreciated   · Plan to transition to IP hospice, referral placed   · Continue CMO   · Add pleasure feeds   · Symptom management     Severe protein-calorie malnutrition [facility])  Assessment & Plan  Malnutrition Findings:   Adult Malnutrition type: Acute illness  Adult Degree of Malnutrition: Other severe protein calorie malnutrition   · Malnutrition Characteristics: Fat loss, Muscle loss, Inadequate energy, Weight loss (PCM d/t dysphagia AEB 12 8% wt decrease x 2 weeks  Loss of muscle mass: temples depressed, clavicle clearly visible, loss in UE's,  Loss of fat mass: orbitals are dark and hollow  BMI Findings:  Adult BMI Classifications: Underweight < 18 5  Body mass index is 14 8 kg/m²  · Nutrition following    Hypodense mass of liver  Assessment & Plan  · CT C/A/P: "A few scattered tiny hepatic hypodensities statistically most likely representing cysts or hemangiomas  However, metastatic disease cannot be excluded "    Mediastinal lymphadenopathy  Assessment & Plan  · CT C/A/P: "Mild mediastinal lymphadenopathy which may be reactive in nature or secondary to metastatic disease "  · Likely represents metastatic dz     Narcotic dependency, continuous ([facility] [address] )  Assessment & Plan  · Per PDMP suboxone 8-2mg film BID    VTE Pharmacologic Prophylaxis:   Pharmacologic: Pharmacologic VTE Prophylaxis contraindicated due to CMO  Mechanical VTE Prophylaxis in Place: Yes    Patient Centered Rounds: I have performed bedside rounds with nursing staff today  Discussions with Specialists or Other Care Team Provider: nursing     Education and Discussions with Family / Patient: patient, family at bedside      Time Spent for Care: 30 minutes  More than 50% of total time spent on counseling and coordination of care as described above  Current Length of Stay: 2 day(s)    Current Patient Status: Inpatient   Certification Statement: The patient will continue to require additional inpatient hospital stay due to pending safe d/c planning to inpatient hospice     Discharge Plan: pending, inpatient hospice     Code Status: Level 4 - Comfort Care    Subjective:   Pt seen and examined at bedside   Wants to try pleasure feeds  Objective:     Vitals:   Temp (24hrs), Av 9 °F (36 6 °C), Min:97 4 °F (36 3 °C), Max:98 8 °F (37 1 °C)    Temp:  [97 4 °F (36 3 °C)-98 8 °F (37 1 °C)] 98 8 °F (37 1 °C)  HR:  [64-70] 70  Resp:  [16-20] 16  BP: (153-166)/() 161/101  SpO2:  [94 %-98 %] 98 %  Body mass index is 14 8 kg/m²  Input and Output Summary (last 24 hours): Intake/Output Summary (Last 24 hours) at 8/3/2021 0906  Last data filed at 8/3/2021 3864  Gross per 24 hour   Intake 638 33 ml   Output 600 ml   Net 38 33 ml       Physical Exam:     Physical Exam  Constitutional:       Appearance: He is ill-appearing  Comments: Cachexia cachexia    HENT:      Head: Normocephalic  Mouth/Throat:      Mouth: Mucous membranes are dry  Eyes:      Extraocular Movements: Extraocular movements intact  Pulmonary:      Effort: Pulmonary effort is normal       Breath sounds: Normal breath sounds  Abdominal:      General: Abdomen is flat  Palpations: Abdomen is soft  Musculoskeletal:         General: No swelling  Normal range of motion  Cervical back: Normal range of motion and neck supple  Skin:     General: Skin is warm and dry  Neurological:      Mental Status: He is alert  Cranial Nerves: Cranial nerve deficit (palsy 7) present         Additional Data:     Labs:    Results from last 7 days   Lab Units 21  0443 21  2153   WBC Thousand/uL 12 66* 17 39*   HEMOGLOBIN g/dL 10 9* 10 1*   HEMATOCRIT % 33 4* 29 6*   PLATELETS Thousands/uL 190 223   BANDS PCT %  --  1   LYMPHO PCT %  --  8*   MONO PCT %  --  1*   EOS PCT %  --  0     Results from last 7 days   Lab Units 21  0443 21  2153   SODIUM mmol/L 136 133*   POTASSIUM mmol/L 3 1* 3 1*   CHLORIDE mmol/L 96* 97*   CO2 mmol/L 30 32   BUN mg/dL 21 23   CREATININE mg/dL 0 69 0 96   ANION GAP mmol/L 10 4   CALCIUM mg/dL 8 6 8 8   ALBUMIN g/dL  --  2 8*   TOTAL BILIRUBIN mg/dL  --  0 60   ALK PHOS U/L  --  66   ALT U/L --  74   AST U/L  --  31   GLUCOSE RANDOM mg/dL 93 150*                 Results from last 7 days   Lab Units 08/02/21  0443 07/31/21  2250   LACTIC ACID mmol/L  --  1 3   PROCALCITONIN ng/ml 0 13 0 36*           * I Have Reviewed All Lab Data Listed Above  * Additional Pertinent Lab Tests Reviewed: [facility] Admission Reviewed    Imaging:    Imaging Reports Reviewed Today Include: all  Imaging Personally Reviewed by Myself Includes:  none    Recent Cultures (last 7 days):     Results from last 7 days   Lab Units 07/31/21  2250   BLOOD CULTURE  No Growth at 48 hrs  No Growth at 48 hrs  Last 24 Hours Medication List:   Current Facility-Administered Medications   Medication Dose Route Frequency Provider Last Rate    buprenorphine-naloxone  4 mg Sublingual BID Nevaeh MORALES PA-C      haloperidol lactate  2 mg Intramuscular Q2H PRN Nevaeh MORALES PA-C      hydrALAZINE  10 mg Intravenous Q6H PRN Mili MORALES PA-C      LORazepam  0 5 mg Intravenous Q4H PRN Cristhian Dorsey PA-C      morphine injection  5 mg Intravenous Q1H PRN Mili MORALES PA-C      nicotine  1 patch Transdermal Daily Mili MORALES PA-C      ondansetron  4 mg Intravenous Q4H PRN Omid Fernandez PA-C          Today, Patient Was Seen By: Filomena Guerra PA-C    ** Please Note: Dictation voice to text software may have been used in the creation of this document   **

## 2021-08-03 NOTE — PLAN OF CARE
Problem: PAIN - ADULT  Goal: Verbalizes/displays adequate comfort level or baseline comfort level  Description: Interventions:  - Encourage patient to monitor pain and request assistance  - Assess pain using appropriate pain scale  - Administer analgesics based on type and severity of pain and evaluate response  - Implement non-pharmacological measures as appropriate and evaluate response  - Consider cultural and social influences on pain and pain management  - Notify physician/advanced practitioner if interventions unsuccessful or patient reports new pain  Outcome: Progressing     Problem: INFECTION - ADULT  Goal: Absence or prevention of progression during hospitalization  Description: INTERVENTIONS:  - Assess and monitor for signs and symptoms of infection  - Monitor lab/diagnostic results  - Monitor all insertion sites, i e  indwelling lines, tubes, and drains  - Monitor endotracheal if appropriate and nasal secretions for changes in amount and color  - [place] appropriate cooling/warming therapies per order  - Administer medications as ordered  - Instruct and encourage patient and family to use good hand hygiene technique  - Identify and instruct in appropriate isolation precautions for identified infection/condition  Outcome: Progressing  Goal: Absence of fever/infection during neutropenic period  Description: INTERVENTIONS:  - Monitor WBC    Outcome: Progressing     Problem: SAFETY ADULT  Goal: Patient will remain free of falls  Description: INTERVENTIONS:  - Educate patient/family on patient safety including physical limitations  - Instruct patient to call for assistance with activity   - Consult OT/PT to assist with strengthening/mobility   - Keep Call bell within reach  - Keep bed low and locked with side rails adjusted as appropriate  - Keep care items and personal belongings within reach  - Initiate and maintain comfort rounds  - Make Fall Risk Sign visible to staff  - Offer Toileting every x Hours, in advance of need  - Initiate/Maintain xalarm  - Obtain necessary fall risk management equipment: x  - Apply yellow socks and bracelet for high fall risk patients  - Consider moving patient to room near nurses station  Outcome: Progressing  Goal: Maintain or return to baseline ADL function  Description: INTERVENTIONS:  -  Assess patient's ability to carry out ADLs; assess patient's baseline for ADL function and identify physical deficits which impact ability to perform ADLs (bathing, care of mouth/teeth, toileting, grooming, dressing, etc )  - Assess/evaluate cause of self-care deficits   - Assess range of motion  - Assess patient's mobility; develop plan if impaired  - Assess patient's need for assistive devices and provide as appropriate  - Encourage maximum independence but intervene and supervise when necessary  - Involve family in performance of ADLs  - Assess for home care needs following discharge   - Consider OT consult to assist with ADL evaluation and planning for discharge  - Provide patient education as appropriate  Outcome: Progressing  Goal: Maintains/Returns to pre admission functional level  Description: INTERVENTIONS:  - Perform BMAT or MOVE assessment daily    - Set and communicate daily mobility goal to care team and patient/family/caregiver  - Collaborate with rehabilitation services on mobility goals if consulted  - Perform Range of Motion x times a day  - Reposition patient every x hours    - Dangle patient x times a day  - Stand patient x times a day  - Ambulate patient x times a day  - Out of bed to chair x times a day   - Out of bed for meals x times a day  - Out of bed for toileting  - Record patient progress and toleration of activity level   Outcome: Progressing     Problem: DISCHARGE PLANNING  Goal: Discharge to home or other facility with appropriate resources  Description: INTERVENTIONS:  - Identify barriers to discharge w/patient and caregiver  - Arrange for needed discharge resources and transportation as appropriate  - Identify discharge learning needs (meds, wound care, etc )  - Arrange for interpretive services to assist at discharge as needed  - Refer to Case Management Department for coordinating discharge planning if the patient needs post-hospital services based on physician/advanced practitioner order or complex needs related to functional status, cognitive ability, or social support system  Outcome: Progressing     Problem: Knowledge Deficit  Goal: Patient/family/caregiver demonstrates understanding of disease process, treatment plan, medications, and discharge instructions  Description: Complete learning assessment and assess knowledge base  Interventions:  - Provide teaching at level of understanding  - Provide teaching via preferred learning methods  Outcome: Progressing     Problem: Nutrition/Hydration-ADULT  Goal: Nutrient/Hydration intake appropriate for improving, restoring or maintaining nutritional needs  Description: Monitor and assess patient's nutrition/hydration status for malnutrition  Collaborate with interdisciplinary team and initiate plan and interventions as ordered  Monitor patient's weight and dietary intake as ordered or per policy  Utilize nutrition screening tool and intervene as necessary  Determine patient's food preferences and provide high-protein, high-caloric foods as appropriate       INTERVENTIONS:  - Monitor oral intake, urinary output, labs, and treatment plans  - Assess nutrition and hydration status and recommend course of action  - Evaluate amount of meals eaten  - Assist patient with eating if necessary   - Allow adequate time for meals  - Recommend/ encourage appropriate diets, oral nutritional supplements, and vitamin/mineral supplements  - Order, calculate, and assess calorie counts as needed  - Recommend, monitor, and adjust tube feedings and TPN/PPN based on assessed needs  - Assess need for intravenous fluids  - Provide specific nutrition/hydration education as appropriate  - Include patient/family/caregiver in decisions related to nutrition  Outcome: Progressing     Problem: MOBILITY - ADULT  Goal: Maintain or return to baseline ADL function  Description: INTERVENTIONS:  -  Assess patient's ability to carry out ADLs; assess patient's baseline for ADL function and identify physical deficits which impact ability to perform ADLs (bathing, care of mouth/teeth, toileting, grooming, dressing, etc )  - Assess/evaluate cause of self-care deficits   - Assess range of motion  - Assess patient's mobility; develop plan if impaired  - Assess patient's need for assistive devices and provide as appropriate  - Encourage maximum independence but intervene and supervise when necessary  - Involve family in performance of ADLs  - Assess for home care needs following discharge   - Consider OT consult to assist with ADL evaluation and planning for discharge  - Provide patient education as appropriate  Outcome: Progressing  Goal: Maintains/Returns to pre admission functional level  Description: INTERVENTIONS:  - Perform BMAT or MOVE assessment daily    - Set and communicate daily mobility goal to care team and patient/family/caregiver  - Collaborate with rehabilitation services on mobility goals if consulted  - Perform Range of Motion x times a day  - Reposition patient every x hours    - Dangle patient x times a day  - Stand patient x times a day  - Ambulate patient x times a day  - Out of bed to chair x times a day   - Out of bed for meals x times a day  - Out of bed for toileting  - Record patient progress and toleration of activity level   Outcome: Progressing     Problem: Potential for Falls  Goal: Patient will remain free of falls  Description: INTERVENTIONS:  - Educate patient/family on patient safety including physical limitations  - Instruct patient to call for assistance with activity   - Consult OT/PT to assist with strengthening/mobility   - Keep Call bell within reach  - Keep bed low and locked with side rails adjusted as appropriate  - Keep care items and personal belongings within reach  - Initiate and maintain comfort rounds  - Make Fall Risk Sign visible to staff  - Offer Toileting every x Hours, in advance of need  - Initiate/Maintain xalarm  - Obtain necessary fall risk management equipment: x  - Apply yellow socks and bracelet for high fall risk patients  - Consider moving patient to room near nurses station  Outcome: Progressing     Problem: Prexisting or High Potential for Compromised Skin Integrity  Goal: Skin integrity is maintained or improved  Description: INTERVENTIONS:  - Identify patients at risk for skin breakdown  - Assess and monitor skin integrity  - Assess and monitor nutrition and hydration status  - Monitor labs   - Assess for incontinence   - Turn and reposition patient  - Assist with mobility/ambulation  - Relieve pressure over bony prominences  - Avoid friction and shearing  - Provide appropriate hygiene as needed including keeping skin clean and dry  - Evaluate need for skin moisturizer/barrier cream  - Collaborate with interdisciplinary team   - Patient/family teaching  - Consider wound care consult   Outcome: Progressing     Problem: RESPIRATORY - ADULT  Goal: Achieves optimal ventilation and oxygenation  Description: INTERVENTIONS:  - Assess for changes in respiratory status  - Assess for changes in mentation and behavior  - Position to facilitate oxygenation and minimize respiratory effort  - Oxygen administered by appropriate delivery if ordered  - Initiate smoking cessation education as indicated  - Encourage broncho-pulmonary hygiene including cough, deep breathe, Incentive Spirometry  - Assess the need for suctioning and aspirate as needed  - Assess and instruct to report SOB or any respiratory difficulty  - Respiratory Therapy support as indicated  Outcome: Progressing     Problem: GASTROINTESTINAL - ADULT  Goal: Minimal or absence of nausea and/or vomiting  Description: INTERVENTIONS:  - Administer IV fluids if ordered to ensure adequate hydration  - Maintain NPO status until nausea and vomiting are resolved  - Nasogastric tube if ordered  - Administer ordered antiemetic medications as needed  - Provide nonpharmacologic comfort measures as appropriate  - Advance diet as tolerated, if ordered  - Consider nutrition services referral to assist patient with adequate nutrition and appropriate food choices  Outcome: Progressing  Goal: Maintains adequate nutritional intake  Description: INTERVENTIONS:  - Monitor percentage of each meal consumed  - Identify factors contributing to decreased intake, treat as appropriate  - Assist with meals as needed  - Monitor I&O, weight, and lab values if indicated  - Obtain nutrition services referral as needed  Outcome: Progressing  Goal: Oral mucous membranes remain intact  Description: INTERVENTIONS  - Assess oral mucosa and hygiene practices  - Implement preventative oral hygiene regimen  - Implement oral medicated treatments as ordered  - Initiate Nutrition services referral as needed  Outcome: Progressing     Problem: METABOLIC, FLUID AND ELECTROLYTES - ADULT  Goal: Electrolytes maintained within normal limits  Description: INTERVENTIONS:  - Monitor labs and assess patient for signs and symptoms of electrolyte imbalances  - Administer electrolyte replacement as ordered  - Monitor response to electrolyte replacements, including repeat lab results as appropriate  - Instruct patient on fluid and nutrition as appropriate  Outcome: Progressing  Goal: Fluid balance maintained  Description: INTERVENTIONS:  - Monitor labs   - Monitor I/O and WT  - Instruct patient on fluid and nutrition as appropriate  - Assess for signs & symptoms of volume excess or deficit  Outcome: Progressing     Problem: SKIN/TISSUE INTEGRITY - ADULT  Goal: Skin Integrity remains intact(Skin Breakdown Prevention)  Description: Assess:  -Perform Paul assessment every x  -Clean and moisturize skin every x  -Inspect skin when repositioning, toileting, and assisting with ADLS  -Assess under medical devices such as x every x  -Assess extremities for adequate circulation and sensation     Bed Management:  -Have minimal linens on bed & keep smooth, unwrinkled  -Change linens as needed when moist or perspiring  -Avoid sitting or lying in one position for more than x hours while in bed  -[facility] HOB at [facility]     Toileting:  -Offer bedside commode  -Assess for incontinence every x  -Use incontinent care products after each incontinent episode such as x    Activity:  -Mobilize patient x times a day  -Encourage activity and walks on unit  -Encourage or provide ROM exercises   -Turn and reposition patient every x Hours  -Use appropriate equipment to lift or move patient in bed  -Instruct/ Assist with weight shifting every x when out of bed in chair  -Consider limitation of chair time x hour intervals    Skin Care:  -Avoid use of baby powder, tape, friction and shearing, hot water or constrictive clothing  -Relieve pressure over bony prominences using x  -Do not massage red bony areas    Next Steps:  -Teach patient strategies to minimize risks such as x   -Consider consults to  interdisciplinary teams such as x  Outcome: Progressing     Problem: HEMATOLOGIC - ADULT  Goal: Maintains hematologic stability  Description: INTERVENTIONS  - Assess for signs and symptoms of bleeding or hemorrhage  - Monitor labs  - Administer supportive blood products/factors as ordered and appropriate  Outcome: Progressing     Problem: MUSCULOSKELETAL - ADULT  Goal: Maintain or return mobility to safest level of function  Description: INTERVENTIONS:  - Assess patient's ability to carry out ADLs; assess patient's baseline for ADL function and identify physical deficits which impact ability to perform ADLs (bathing, care of mouth/teeth, toileting, grooming, dressing, etc )  - Assess/evaluate cause of self-care deficits   - Assess range of motion  - Assess patient's mobility  - Assess patient's need for assistive devices and provide as appropriate  - Encourage maximum independence but intervene and supervise when necessary  - Involve family in performance of ADLs  - Assess for home care needs following discharge   - Consider OT consult to assist with ADL evaluation and planning for discharge  - Provide patient education as appropriate  Outcome: Progressing

## 2021-08-04 VITALS
TEMPERATURE: 97.2 F | SYSTOLIC BLOOD PRESSURE: 130 MMHG | HEART RATE: 91 BPM | RESPIRATION RATE: 18 BRPM | BODY MASS INDEX: 14.78 KG/M2 | OXYGEN SATURATION: 94 % | WEIGHT: 109.13 LBS | DIASTOLIC BLOOD PRESSURE: 90 MMHG | HEIGHT: 72 IN

## 2021-08-04 PROCEDURE — 99239 HOSP IP/OBS DSCHRG MGMT >30: CPT | Performed by: PHYSICIAN ASSISTANT

## 2021-08-04 RX ADMIN — BUPRENORPHINE AND NALOXONE 4 MG: 2; .5 FILM BUCCAL; SUBLINGUAL at 08:59

## 2021-08-04 RX ADMIN — MORPHINE SULFATE 5 MG: 10 INJECTION INTRAVENOUS at 06:12

## 2021-08-04 RX ADMIN — LORAZEPAM 0.5 MG: 2 INJECTION INTRAMUSCULAR; INTRAVENOUS at 12:29

## 2021-08-04 RX ADMIN — MORPHINE SULFATE 5 MG: 10 INJECTION INTRAVENOUS at 12:29

## 2021-08-04 RX ADMIN — MORPHINE SULFATE 5 MG: 10 INJECTION INTRAVENOUS at 03:47

## 2021-08-04 RX ADMIN — NICOTINE 1 PATCH: 21 PATCH, EXTENDED RELEASE TRANSDERMAL at 08:59

## 2021-08-04 RX ADMIN — LORAZEPAM 0.5 MG: 2 INJECTION INTRAMUSCULAR; INTRAVENOUS at 03:47

## 2021-08-04 RX ADMIN — MORPHINE SULFATE 5 MG: 10 INJECTION INTRAVENOUS at 10:31

## 2021-08-04 RX ADMIN — MORPHINE SULFATE 5 MG: 10 INJECTION INTRAVENOUS at 08:59

## 2021-08-04 RX ADMIN — LORAZEPAM 0.5 MG: 2 INJECTION INTRAMUSCULAR; INTRAVENOUS at 06:13

## 2021-08-04 NOTE — TRANSPORTATION MEDICAL NECESSITY
Section I - General Information    Name of Patient: Eron Frederick                 : 1965    Medicare #: [id number]  Transport Date: 21 (PCS is valid for round trips on this date and for all repetitive trips in the 60-day range as noted below )  Origin: [facility]: [facility] [place]  Is the pt's stay covered under Medicare Part A (PPS/DRG)   []     Closest appropriate facility? If no, why is transport to more distant facility required? Yes  If hospice pt, is this transport related to pt's terminal illness? NA       Section II - Medical Necessity Questionnaire  Ambulance transportation is medically necessary only if other means of transport are contraindicated or would be potentially harmful to the patient  To meet this requirement, the patient must either be "bed confined" or suffer from a condition such that transport by means other than ambulance is contraindicated by the patient's condition  The following questions must be answered by the medical professional signing below for this form to be valid:    1)  Describe the MEDICAL CONDITION (physical and/or mental) of this patient AT [address] [place] [address] that requires the patient to be transported in an ambulance and why transport by other means is contraindicated by the patient's condition: Dx  Oropharyngeal CA, 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 Yes  To be "be confined" the patient must satisfy all three of the following conditions: (1) unable to get up from bed without Assistance; AND (2) unable to ambulate; AND (3) unable to sit in a chair or wheelchair  3) Can this patient safely be transported by car or wheelchair van (i e , seated during transport without a medical attendant or monitoring)?    No    4) In addition to completing questions 1-3 above, please check any of the following conditions that apply*:   *Note: supporting documentation for any boxes checked must be maintained in the patient's medical records  If hosp-hosp transfer, describe services needed at 2nd facility not available at 1st facility? Requires oxygen-unable to self administer  Unable to tolerate seated position for time needed to transport   Other(specify) fall risk      Section III - Signature of Physician or Healthcare Professional  I certify that the above information is true and correct based on my evaluation of this patient, and represent that the patient requires transport by ambulance and that other forms of transport are contraindicated  I understand that this information will be used by the Centers for Medicare and Medicaid Services (CMS) to support the determination of medical necessity for ambulance services, and I represent that I have personal knowledge of the patient's condition at time of transport  []  If this box is checked, I also certify that the patient is physically or mentally incapable of signing the ambulance service's claim and that the institution with which I am affiliated has furnished care, services, or assistance to the patient  My signature below is made on behalf of the patient pursuant to 42 CFR §424 36(b)(4)  In accordance with 42 CFR §424 37, the specific reason(s) that the patient is physically or mentally incapable of signing the claim form is as follows:       Signature of Physician* or Healthcare Professional______________________________________________________________  Signature Date 08/04/21 (For scheduled repetitive transports, this form is not valid for transports performed more than 60 days after this date)    Printed Name & Credentials of Physician or Healthcare Professional (MD, DO, RN, etc )________________________________  *Form must be signed by patient's attending physician for scheduled, repetitive transports   For non-repetitive, unscheduled ambulance transports, if unable to obtain the signature of the attending physician, any of the following may sign (choose appropriate option below)  [] Physician Assistant []  Clinical Nurse Specialist []  Registered Nurse  []  Nurse Practitioner  [x] Discharge Planner

## 2021-08-04 NOTE — PLAN OF CARE
Problem: PAIN - ADULT  Goal: Verbalizes/displays adequate comfort level or baseline comfort level  Description: Interventions:  - Encourage patient to monitor pain and request assistance  - Assess pain using appropriate pain scale  - Administer analgesics based on type and severity of pain and evaluate response  - Implement non-pharmacological measures as appropriate and evaluate response  - Consider cultural and social influences on pain and pain management  - Notify physician/advanced practitioner if interventions unsuccessful or patient reports new pain  Outcome: Progressing     Problem: INFECTION - ADULT  Goal: Absence or prevention of progression during hospitalization  Description: INTERVENTIONS:  - Assess and monitor for signs and symptoms of infection  - Monitor lab/diagnostic results  - Monitor all insertion sites, i e  indwelling lines, tubes, and drains  - Monitor endotracheal if appropriate and nasal secretions for changes in amount and color  - [place] appropriate cooling/warming therapies per order  - Administer medications as ordered  - Instruct and encourage patient and family to use good hand hygiene technique  - Identify and instruct in appropriate isolation precautions for identified infection/condition  Outcome: Progressing  Goal: Absence of fever/infection during neutropenic period  Description: INTERVENTIONS:  - Monitor WBC    Outcome: Progressing     Problem: SAFETY ADULT  Goal: Patient will remain free of falls  Description: INTERVENTIONS:  - Educate patient/family on patient safety including physical limitations  - Instruct patient to call for assistance with activity   - Consult OT/PT to assist with strengthening/mobility   - Keep Call bell within reach  - Keep bed low and locked with side rails adjusted as appropriate  - Keep care items and personal belongings within reach  - Initiate and maintain comfort rounds  - Make Fall Risk Sign visible to staff  - Offer Toileting every x Hours, in advance of need  - Initiate/Maintain xalarm  - Obtain necessary fall risk management equipment: x  - Apply yellow socks and bracelet for high fall risk patients  - Consider moving patient to room near nurses station  Outcome: Progressing  Goal: Maintain or return to baseline ADL function  Description: INTERVENTIONS:  -  Assess patient's ability to carry out ADLs; assess patient's baseline for ADL function and identify physical deficits which impact ability to perform ADLs (bathing, care of mouth/teeth, toileting, grooming, dressing, etc )  - Assess/evaluate cause of self-care deficits   - Assess range of motion  - Assess patient's mobility; develop plan if impaired  - Assess patient's need for assistive devices and provide as appropriate  - Encourage maximum independence but intervene and supervise when necessary  - Involve family in performance of ADLs  - Assess for home care needs following discharge   - Consider OT consult to assist with ADL evaluation and planning for discharge  - Provide patient education as appropriate  Outcome: Progressing  Goal: Maintains/Returns to pre admission functional level  Description: INTERVENTIONS:  - Perform BMAT or MOVE assessment daily    - Set and communicate daily mobility goal to care team and patient/family/caregiver  - Collaborate with rehabilitation services on mobility goals if consulted  - Perform Range of Motion x times a day  - Reposition patient every x hours    - Dangle patient x times a day  - Stand patient x times a day  - Ambulate patient x times a day  - Out of bed to chair x times a day   - Out of bed for meals x times a day  - Out of bed for toileting  - Record patient progress and toleration of activity level   Outcome: Progressing     Problem: DISCHARGE PLANNING  Goal: Discharge to home or other facility with appropriate resources  Description: INTERVENTIONS:  - Identify barriers to discharge w/patient and caregiver  - Arrange for needed discharge resources and transportation as appropriate  - Identify discharge learning needs (meds, wound care, etc )  - Arrange for interpretive services to assist at discharge as needed  - Refer to Case Management Department for coordinating discharge planning if the patient needs post-hospital services based on physician/advanced practitioner order or complex needs related to functional status, cognitive ability, or social support system  Outcome: Progressing     Problem: Knowledge Deficit  Goal: Patient/family/caregiver demonstrates understanding of disease process, treatment plan, medications, and discharge instructions  Description: Complete learning assessment and assess knowledge base  Interventions:  - Provide teaching at level of understanding  - Provide teaching via preferred learning methods  Outcome: Progressing     Problem: Nutrition/Hydration-ADULT  Goal: Nutrient/Hydration intake appropriate for improving, restoring or maintaining nutritional needs  Description: Monitor and assess patient's nutrition/hydration status for malnutrition  Collaborate with interdisciplinary team and initiate plan and interventions as ordered  Monitor patient's weight and dietary intake as ordered or per policy  Utilize nutrition screening tool and intervene as necessary  Determine patient's food preferences and provide high-protein, high-caloric foods as appropriate       INTERVENTIONS:  - Monitor oral intake, urinary output, labs, and treatment plans  - Assess nutrition and hydration status and recommend course of action  - Evaluate amount of meals eaten  - Assist patient with eating if necessary   - Allow adequate time for meals  - Recommend/ encourage appropriate diets, oral nutritional supplements, and vitamin/mineral supplements  - Order, calculate, and assess calorie counts as needed  - Recommend, monitor, and adjust tube feedings and TPN/PPN based on assessed needs  - Assess need for intravenous fluids  - Provide specific nutrition/hydration education as appropriate  - Include patient/family/caregiver in decisions related to nutrition  Outcome: Progressing     Problem: MOBILITY - ADULT  Goal: Maintain or return to baseline ADL function  Description: INTERVENTIONS:  -  Assess patient's ability to carry out ADLs; assess patient's baseline for ADL function and identify physical deficits which impact ability to perform ADLs (bathing, care of mouth/teeth, toileting, grooming, dressing, etc )  - Assess/evaluate cause of self-care deficits   - Assess range of motion  - Assess patient's mobility; develop plan if impaired  - Assess patient's need for assistive devices and provide as appropriate  - Encourage maximum independence but intervene and supervise when necessary  - Involve family in performance of ADLs  - Assess for home care needs following discharge   - Consider OT consult to assist with ADL evaluation and planning for discharge  - Provide patient education as appropriate  Outcome: Progressing  Goal: Maintains/Returns to pre admission functional level  Description: INTERVENTIONS:  - Perform BMAT or MOVE assessment daily    - Set and communicate daily mobility goal to care team and patient/family/caregiver  - Collaborate with rehabilitation services on mobility goals if consulted  - Perform Range of Motion x times a day  - Reposition patient every x hours    - Dangle patient x times a day  - Stand patient x times a day  - Ambulate patient x times a day  - Out of bed to chair x times a day   - Out of bed for meals x times a day  - Out of bed for toileting  - Record patient progress and toleration of activity level   Outcome: Progressing     Problem: Potential for Falls  Goal: Patient will remain free of falls  Description: INTERVENTIONS:  - Educate patient/family on patient safety including physical limitations  - Instruct patient to call for assistance with activity   - Consult OT/PT to assist with strengthening/mobility   - Keep Call bell within reach  - Keep bed low and locked with side rails adjusted as appropriate  - Keep care items and personal belongings within reach  - Initiate and maintain comfort rounds  - Make Fall Risk Sign visible to staff  - Offer Toileting every x Hours, in advance of need  - Initiate/Maintain xalarm  - Obtain necessary fall risk management equipment: x  - Apply yellow socks and bracelet for high fall risk patients  - Consider moving patient to room near nurses station  Outcome: Progressing     Problem: Prexisting or High Potential for Compromised Skin Integrity  Goal: Skin integrity is maintained or improved  Description: INTERVENTIONS:  - Identify patients at risk for skin breakdown  - Assess and monitor skin integrity  - Assess and monitor nutrition and hydration status  - Monitor labs   - Assess for incontinence   - Turn and reposition patient  - Assist with mobility/ambulation  - Relieve pressure over bony prominences  - Avoid friction and shearing  - Provide appropriate hygiene as needed including keeping skin clean and dry  - Evaluate need for skin moisturizer/barrier cream  - Collaborate with interdisciplinary team   - Patient/family teaching  - Consider wound care consult   Outcome: Progressing     Problem: RESPIRATORY - ADULT  Goal: Achieves optimal ventilation and oxygenation  Description: INTERVENTIONS:  - Assess for changes in respiratory status  - Assess for changes in mentation and behavior  - Position to facilitate oxygenation and minimize respiratory effort  - Oxygen administered by appropriate delivery if ordered  - Initiate smoking cessation education as indicated  - Encourage broncho-pulmonary hygiene including cough, deep breathe, Incentive Spirometry  - Assess the need for suctioning and aspirate as needed  - Assess and instruct to report SOB or any respiratory difficulty  - Respiratory Therapy support as indicated  Outcome: Progressing     Problem: GASTROINTESTINAL - ADULT  Goal: Minimal or absence of nausea and/or vomiting  Description: INTERVENTIONS:  - Administer IV fluids if ordered to ensure adequate hydration  - Maintain NPO status until nausea and vomiting are resolved  - Nasogastric tube if ordered  - Administer ordered antiemetic medications as needed  - Provide nonpharmacologic comfort measures as appropriate  - Advance diet as tolerated, if ordered  - Consider nutrition services referral to assist patient with adequate nutrition and appropriate food choices  Outcome: Progressing  Goal: Maintains adequate nutritional intake  Description: INTERVENTIONS:  - Monitor percentage of each meal consumed  - Identify factors contributing to decreased intake, treat as appropriate  - Assist with meals as needed  - Monitor I&O, weight, and lab values if indicated  - Obtain nutrition services referral as needed  Outcome: Progressing  Goal: Oral mucous membranes remain intact  Description: INTERVENTIONS  - Assess oral mucosa and hygiene practices  - Implement preventative oral hygiene regimen  - Implement oral medicated treatments as ordered  - Initiate Nutrition services referral as needed  Outcome: Progressing     Problem: METABOLIC, FLUID AND ELECTROLYTES - ADULT  Goal: Electrolytes maintained within normal limits  Description: INTERVENTIONS:  - Monitor labs and assess patient for signs and symptoms of electrolyte imbalances  - Administer electrolyte replacement as ordered  - Monitor response to electrolyte replacements, including repeat lab results as appropriate  - Instruct patient on fluid and nutrition as appropriate  Outcome: Progressing  Goal: Fluid balance maintained  Description: INTERVENTIONS:  - Monitor labs   - Monitor I/O and WT  - Instruct patient on fluid and nutrition as appropriate  - Assess for signs & symptoms of volume excess or deficit  Outcome: Progressing     Problem: SKIN/TISSUE INTEGRITY - ADULT  Goal: Skin Integrity remains intact(Skin Breakdown Prevention)  Description: Assess:  -Perform Paul assessment every x  -Clean and moisturize skin every x  -Inspect skin when repositioning, toileting, and assisting with ADLS  -Assess under medical devices such as x every x  -Assess extremities for adequate circulation and sensation     Bed Management:  -Have minimal linens on bed & keep smooth, unwrinkled  -Change linens as needed when moist or perspiring  -Avoid sitting or lying in one position for more than x hours while in bed  -Keep HOB at [facility]     Toileting:  -Offer bedside commode  -Assess for incontinence every x  -Use incontinent care products after each incontinent episode such as x    Activity:  -Mobilize patient x times a day  -Encourage activity and walks on unit  -Encourage or provide ROM exercises   -Turn and reposition patient every x Hours  -Use appropriate equipment to lift or move patient in bed  -Instruct/ Assist with weight shifting every x when out of bed in chair  -Consider limitation of chair time x hour intervals    Skin Care:  -Avoid use of baby powder, tape, friction and shearing, hot water or constrictive clothing  -Relieve pressure over bony prominences using x  -Do not massage red bony areas    Next Steps:  -Teach patient strategies to minimize risks such as x   -Consider consults to  interdisciplinary teams such as x  Outcome: Progressing     Problem: HEMATOLOGIC - ADULT  Goal: Maintains hematologic stability  Description: INTERVENTIONS  - Assess for signs and symptoms of bleeding or hemorrhage  - Monitor labs  - Administer supportive blood products/factors as ordered and appropriate  Outcome: Progressing     Problem: MUSCULOSKELETAL - ADULT  Goal: Maintain or return mobility to safest level of function  Description: INTERVENTIONS:  - Assess patient's ability to carry out ADLs; assess patient's baseline for ADL function and identify physical deficits which impact ability to perform ADLs (bathing, care of mouth/teeth, toileting, grooming, dressing, etc )  - Assess/evaluate cause of self-care deficits   - Assess range of motion  - Assess patient's mobility  - Assess patient's need for assistive devices and provide as appropriate  - Encourage maximum independence but intervene and supervise when necessary  - Involve family in performance of ADLs  - Assess for home care needs following discharge   - Consider OT consult to assist with ADL evaluation and planning for discharge  - Provide patient education as appropriate  Outcome: Progressing

## 2021-08-04 NOTE — CASE MANAGEMENT
Spoke with Bee Das @ acute care  p/u at 12:30 on 8/4/21  transport from [facility] to [address] [place] [address] needs to be obtained through [facility] [phone]  Milena joseph

## 2021-08-04 NOTE — CASE MANAGEMENT
Continue to follow  Call placed to [facility]- [address]), spoke with [facility] for ambulance [address] informed that she will contact [facility] as a 3rd party for authorization for [facility] and [facility]([phone]) will contact [facility] to give auth  Call placed to [facility],([phone]), spoke with Charan Maya, informed Charan Maya that [facility] will be in contact with [facility] re: ambulance [facility]  Charan Maya in agreement

## 2021-08-04 NOTE — PLAN OF CARE
Problem: PAIN - ADULT  Goal: Verbalizes/displays adequate comfort level or baseline comfort level  Description: Interventions:  - Encourage patient to monitor pain and request assistance  - Assess pain using appropriate pain scale  - Administer analgesics based on type and severity of pain and evaluate response  - Implement non-pharmacological measures as appropriate and evaluate response  - Consider cultural and social influences on pain and pain management  - Notify physician/advanced practitioner if interventions unsuccessful or patient reports new pain  8/4/2021 1220 by Flor Khan RN  Outcome: Adequate for Discharge  8/4/2021 0722 by Flor Khan RN  Outcome: Progressing     Problem: INFECTION - ADULT  Goal: Absence or prevention of progression during hospitalization  Description: INTERVENTIONS:  - Assess and monitor for signs and symptoms of infection  - Monitor lab/diagnostic results  - Monitor all insertion sites, i e  indwelling lines, tubes, and drains  - Monitor endotracheal if appropriate and nasal secretions for changes in amount and color  - [place] appropriate cooling/warming therapies per order  - Administer medications as ordered  - Instruct and encourage patient and family to use good hand hygiene technique  - Identify and instruct in appropriate isolation precautions for identified infection/condition  8/4/2021 1220 by Flor Khan RN  Outcome: Adequate for Discharge  8/4/2021 0722 by Flor Khan RN  Outcome: Progressing  Goal: Absence of fever/infection during neutropenic period  Description: INTERVENTIONS:  - Monitor WBC    8/4/2021 1220 by Flor Khan RN  Outcome: Adequate for Discharge  8/4/2021 0722 by Flor Khan RN  Outcome: Progressing     Problem: SAFETY ADULT  Goal: Patient will remain free of falls  Description: INTERVENTIONS:  - Educate patient/family on patient safety including physical limitations  - Instruct patient to call for assistance with activity   - Consult OT/PT to assist with strengthening/mobility   - Keep Call bell within reach  - Keep bed low and locked with side rails adjusted as appropriate  - Keep care items and personal belongings within reach  - Initiate and maintain comfort rounds  - Make Fall Risk Sign visible to staff  - Offer Toileting every x Hours, in advance of need  - Initiate/Maintain xalarm  - Obtain necessary fall risk management equipment: x  - Apply yellow socks and bracelet for high fall risk patients  - Consider moving patient to room near nurses station  8/4/2021 1220 by Mandy Villa RN  Outcome: Adequate for Discharge  8/4/2021 0722 by Mandy Villa RN  Outcome: Progressing  Goal: Maintain or return to baseline ADL function  Description: INTERVENTIONS:  -  Assess patient's ability to carry out ADLs; assess patient's baseline for ADL function and identify physical deficits which impact ability to perform ADLs (bathing, care of mouth/teeth, toileting, grooming, dressing, etc )  - Assess/evaluate cause of self-care deficits   - Assess range of motion  - Assess patient's mobility; develop plan if impaired  - Assess patient's need for assistive devices and provide as appropriate  - Encourage maximum independence but intervene and supervise when necessary  - Involve family in performance of ADLs  - Assess for home care needs following discharge   - Consider OT consult to assist with ADL evaluation and planning for discharge  - Provide patient education as appropriate  8/4/2021 1220 by Mandy Villa RN  Outcome: Adequate for Discharge  8/4/2021 0722 by Mandy Villa RN  Outcome: Progressing  Goal: Maintains/Returns to pre admission functional level  Description: INTERVENTIONS:  - Perform BMAT or MOVE assessment daily    - Set and communicate daily mobility goal to care team and patient/family/caregiver     - Collaborate with rehabilitation services on mobility goals if consulted  - Perform Range of Motion x times a day  - Reposition patient every x hours  - Dangle patient x times a day  - Stand patient x times a day  - Ambulate patient x times a day  - Out of bed to chair x times a day   - Out of bed for meals x times a day  - Out of bed for toileting  - Record patient progress and toleration of activity level   8/4/2021 1220 by Juan Miguel Cook RN  Outcome: Adequate for Discharge  8/4/2021 0722 by Juan Miguel Cook RN  Outcome: Progressing     Problem: DISCHARGE PLANNING  Goal: Discharge to home or other facility with appropriate resources  Description: INTERVENTIONS:  - Identify barriers to discharge w/patient and caregiver  - Arrange for needed discharge resources and transportation as appropriate  - Identify discharge learning needs (meds, wound care, etc )  - Arrange for interpretive services to assist at discharge as needed  - Refer to Case Management Department for coordinating discharge planning if the patient needs post-hospital services based on physician/advanced practitioner order or complex needs related to functional status, cognitive ability, or social support system  8/4/2021 1220 by Juan Miguel Cook RN  Outcome: Adequate for Discharge  8/4/2021 0722 by Juan Miguel Cook RN  Outcome: Progressing     Problem: Knowledge Deficit  Goal: Patient/family/caregiver demonstrates understanding of disease process, treatment plan, medications, and discharge instructions  Description: Complete learning assessment and assess knowledge base    Interventions:  - Provide teaching at level of understanding  - Provide teaching via preferred learning methods  8/4/2021 1220 by Juan Miguel Cook RN  Outcome: Adequate for Discharge  8/4/2021 0722 by Juan Miguel Cook RN  Outcome: Progressing     Problem: Nutrition/Hydration-ADULT  Goal: Nutrient/Hydration intake appropriate for improving, restoring or maintaining nutritional needs  Description: Monitor and assess patient's nutrition/hydration status for malnutrition  Collaborate with interdisciplinary team and initiate plan and interventions as ordered  Monitor patient's weight and dietary intake as ordered or per policy  Utilize nutrition screening tool and intervene as necessary  Determine patient's food preferences and provide high-protein, high-caloric foods as appropriate       INTERVENTIONS:  - Monitor oral intake, urinary output, labs, and treatment plans  - Assess nutrition and hydration status and recommend course of action  - Evaluate amount of meals eaten  - Assist patient with eating if necessary   - Allow adequate time for meals  - Recommend/ encourage appropriate diets, oral nutritional supplements, and vitamin/mineral supplements  - Order, calculate, and assess calorie counts as needed  - Recommend, monitor, and adjust tube feedings and TPN/PPN based on assessed needs  - Assess need for intravenous fluids  - Provide specific nutrition/hydration education as appropriate  - Include patient/family/caregiver in decisions related to nutrition  8/4/2021 1220 by Karely Landaverde RN  Outcome: Adequate for Discharge  8/4/2021 0722 by Karely Landaverde RN  Outcome: Progressing     Problem: MOBILITY - ADULT  Goal: Maintain or return to baseline ADL function  Description: INTERVENTIONS:  -  Assess patient's ability to carry out ADLs; assess patient's baseline for ADL function and identify physical deficits which impact ability to perform ADLs (bathing, care of mouth/teeth, toileting, grooming, dressing, etc )  - Assess/evaluate cause of self-care deficits   - Assess range of motion  - Assess patient's mobility; develop plan if impaired  - Assess patient's need for assistive devices and provide as appropriate  - Encourage maximum independence but intervene and supervise when necessary  - Involve family in performance of ADLs  - Assess for home care needs following discharge   - Consider OT consult to assist with ADL evaluation and planning for discharge  - Provide patient education as appropriate  8/4/2021 1220 by Volodymyr Shore RN  Outcome: Adequate for Discharge  8/4/2021 3317 by Volodymyr Shore RN  Outcome: Progressing  Goal: Maintains/Returns to pre admission functional level  Description: INTERVENTIONS:  - Perform BMAT or MOVE assessment daily    - Set and communicate daily mobility goal to care team and patient/family/caregiver  - Collaborate with rehabilitation services on mobility goals if consulted  - Perform Range of Motion x times a day  - Reposition patient every x hours    - Dangle patient x times a day  - Stand patient x times a day  - Ambulate patient x times a day  - Out of bed to chair x times a day   - Out of bed for meals x times a day  - Out of bed for toileting  - Record patient progress and toleration of activity level   8/4/2021 1220 by Volodymyr Shore RN  Outcome: Adequate for Discharge  8/4/2021 0722 by Vloodymyr Shore RN  Outcome: Progressing     Problem: Potential for Falls  Goal: Patient will remain free of falls  Description: INTERVENTIONS:  - Educate patient/family on patient safety including physical limitations  - Instruct patient to call for assistance with activity   - Consult OT/PT to assist with strengthening/mobility   - Keep Call bell within reach  - Keep bed low and locked with side rails adjusted as appropriate  - Keep care items and personal belongings within reach  - Initiate and maintain comfort rounds  - Make Fall Risk Sign visible to staff  - Offer Toileting every x Hours, in advance of need  - Initiate/Maintain xalarm  - Obtain necessary fall risk management equipment: x  - Apply yellow socks and bracelet for high fall risk patients  - Consider moving patient to room near nurses station  8/4/2021 1220 by Volodymyr Shore RN  Outcome: Adequate for Discharge  8/4/2021 [id number] by Volodymyr Shore RN  Outcome: Progressing     Problem: Prexisting or High Potential for Compromised Skin Integrity  Goal: Skin integrity is maintained or improved  Description: INTERVENTIONS:  - Identify patients at risk for skin breakdown  - Assess and monitor skin integrity  - Assess and monitor nutrition and hydration status  - Monitor labs   - Assess for incontinence   - Turn and reposition patient  - Assist with mobility/ambulation  - Relieve pressure over bony prominences  - Avoid friction and shearing  - Provide appropriate hygiene as needed including keeping skin clean and dry  - Evaluate need for skin moisturizer/barrier cream  - Collaborate with interdisciplinary team   - Patient/family teaching  - Consider wound care consult   8/4/2021 1220 by Mandy Villa RN  Outcome: Adequate for Discharge  8/4/2021 0722 by Mandy Villa RN  Outcome: Progressing     Problem: RESPIRATORY - ADULT  Goal: Achieves optimal ventilation and oxygenation  Description: INTERVENTIONS:  - Assess for changes in respiratory status  - Assess for changes in mentation and behavior  - Position to facilitate oxygenation and minimize respiratory effort  - Oxygen administered by appropriate delivery if ordered  - Initiate smoking cessation education as indicated  - Encourage broncho-pulmonary hygiene including cough, deep breathe, Incentive Spirometry  - Assess the need for suctioning and aspirate as needed  - Assess and instruct to report SOB or any respiratory difficulty  - Respiratory Therapy support as indicated  8/4/2021 1220 by Mandy Villa RN  Outcome: Adequate for Discharge  8/4/2021 0722 by Mandy Villa RN  Outcome: Progressing     Problem: GASTROINTESTINAL - ADULT  Goal: Minimal or absence of nausea and/or vomiting  Description: INTERVENTIONS:  - Administer IV fluids if ordered to ensure adequate hydration  - Maintain NPO status until nausea and vomiting are resolved  - Nasogastric tube if ordered  - Administer ordered antiemetic medications as needed  - Provide nonpharmacologic comfort measures as appropriate  - Advance diet as tolerated, if ordered  - Consider nutrition services referral to assist patient with adequate nutrition and appropriate food choices  8/4/2021 1220 by Kin Nissen, RN  Outcome: Adequate for Discharge  8/4/2021 0722 by Kin Nissen, RN  Outcome: Progressing  Goal: Maintains adequate nutritional intake  Description: INTERVENTIONS:  - Monitor percentage of each meal consumed  - Identify factors contributing to decreased intake, treat as appropriate  - Assist with meals as needed  - Monitor I&O, weight, and lab values if indicated  - Obtain nutrition services referral as needed  8/4/2021 1220 by Kin Nissen, RN  Outcome: Adequate for Discharge  8/4/2021 [id number] by Kin Nissen, RN  Outcome: Progressing  Goal: Oral mucous membranes remain intact  Description: INTERVENTIONS  - Assess oral mucosa and hygiene practices  - Implement preventative oral hygiene regimen  - Implement oral medicated treatments as ordered  - Initiate Nutrition services referral as needed  8/4/2021 1220 by Kin Nissen, RN  Outcome: Adequate for Discharge  8/4/2021 0722 by Kin Nissen, RN  Outcome: Progressing     Problem: METABOLIC, FLUID AND ELECTROLYTES - ADULT  Goal: Electrolytes maintained within normal limits  Description: INTERVENTIONS:  - Monitor labs and assess patient for signs and symptoms of electrolyte imbalances  - Administer electrolyte replacement as ordered  - Monitor response to electrolyte replacements, including repeat lab results as appropriate  - Instruct patient on fluid and nutrition as appropriate  8/4/2021 1220 by Kin Nissen, RN  Outcome: Adequate for Discharge  8/4/2021 0722 by Kin Nissen, RN  Outcome: Progressing  Goal: Fluid balance maintained  Description: INTERVENTIONS:  - Monitor labs   - Monitor I/O and WT  - Instruct patient on fluid and nutrition as appropriate  - Assess for signs & symptoms of volume excess or deficit  8/4/2021 1220 by Kin Nissen, RN  Outcome: Adequate for Discharge  8/4/2021 6820 by Mejia Antony RN  Outcome: Progressing     Problem: SKIN/TISSUE INTEGRITY - ADULT  Goal: Skin Integrity remains intact(Skin Breakdown Prevention)  Description: Assess:  -Perform Paul assessment every x  -Clean and moisturize skin every x  -Inspect skin when repositioning, toileting, and assisting with ADLS  -Assess under medical devices such as x every x  -Assess extremities for adequate circulation and sensation     Bed Management:  -Have minimal linens on bed & keep smooth, unwrinkled  -Change linens as needed when moist or perspiring  -Avoid sitting or lying in one position for more than x hours while in bed  -Keep HOB at [facility]     Toileting:  -Offer bedside commode  -Assess for incontinence every x  -Use incontinent care products after each incontinent episode such as x    Activity:  -Mobilize patient x times a day  -Encourage activity and walks on unit  -Encourage or provide ROM exercises   -Turn and reposition patient every x Hours  -Use appropriate equipment to lift or move patient in bed  -Instruct/ Assist with weight shifting every x when out of bed in chair  -Consider limitation of chair time x hour intervals    Skin Care:  -Avoid use of baby powder, tape, friction and shearing, hot water or constrictive clothing  -Relieve pressure over bony prominences using x  -Do not massage red bony areas    Next Steps:  -Teach patient strategies to minimize risks such as x   -Consider consults to  interdisciplinary teams such as x  8/4/2021 1220 by Mejia Antony RN  Outcome: Adequate for Discharge  8/4/2021 0722 by Mejia Antony RN  Outcome: Progressing     Problem: HEMATOLOGIC - ADULT  Goal: Maintains hematologic stability  Description: INTERVENTIONS  - Assess for signs and symptoms of bleeding or hemorrhage  - Monitor labs  - Administer supportive blood products/factors as ordered and appropriate  8/4/2021 1220 by Mejia Antony RN  Outcome: Adequate for Discharge  8/4/2021 1528 by Volodymyr Shore RN  Outcome: Progressing     Problem: MUSCULOSKELETAL - ADULT  Goal: Maintain or return mobility to safest level of function  Description: INTERVENTIONS:  - Assess patient's ability to carry out ADLs; assess patient's baseline for ADL function and identify physical deficits which impact ability to perform ADLs (bathing, care of mouth/teeth, toileting, grooming, dressing, etc )  - Assess/evaluate cause of self-care deficits   - Assess range of motion  - Assess patient's mobility  - Assess patient's need for assistive devices and provide as appropriate  - Encourage maximum independence but intervene and supervise when necessary  - Involve family in performance of ADLs  - Assess for home care needs following discharge   - Consider OT consult to assist with ADL evaluation and planning for discharge  - Provide patient education as appropriate  8/4/2021 1220 by Volodymyr Shore RN  Outcome: Adequate for Discharge  8/4/2021 0722 by Volodymyr Shore RN  Outcome: Progressing

## 2021-08-04 NOTE — ASSESSMENT & PLAN NOTE
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  Invasion of the right right carotid space with encasement of the right internal carotid artery without luminal narrowing and occlusion of the high cervical right IJ  Tumor extends the skull base where there is localized invasion of the right petrous bone  Suggest follow-up contrast enhanced MRI of the brain and skull base to assess extent of disease  Right transverse and sigmoid sinuses are not well seen and may be hypoplastic as there appears to be dominant left-sided drainage however chronic occlusion is also possible  No evidence of acute sinus thrombosis "  · CT A/P shows mild mediastinal lymphadenopathy concerning for metastatic disease, a few scattered tiny hepatic hypodensities for which metastatic disease cannot be excluded and multifocal bilateral upper and lower lobe pulmonary nodules   · Reportedly eval by ENT [place] and [facility] - mass unfortunately inoperable  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

## 2021-08-04 NOTE — NURSING NOTE
Report given to transporters  Made aware of PRN IV Ativan and Morphine given to Pt  To keep Pt  comfortable for ride

## 2021-08-04 NOTE — PROGRESS NOTES
Report given to [facility]  Reviewed Pt 's admission, hospital course and IV PRN Ativan and Morphine given to Pt  To keep comfortable for transport  [facility] aware transport is present to p/u Pt

## 2021-08-04 NOTE — DISCHARGE SUMMARY
[place]  Discharge- Chino Hager 1965, 61 y o  male MRN: [id number]  Unit/Bed#: -01 Encounter: [id number]  Primary Care Provider: Ely Marie DO   Date and time admitted to hospital: 7/31/2021  9:12 PM    Oropharyngeal cancer ([address] )  Assessment & Plan  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  Invasion of the right right carotid space with encasement of the right internal carotid artery without luminal narrowing and occlusion of the high cervical right IJ  Tumor extends the skull base where there is localized invasion of the right petrous bone  Suggest follow-up contrast enhanced MRI of the brain and skull base to assess extent of disease  Right transverse and sigmoid sinuses are not well seen and may be hypoplastic as there appears to be dominant left-sided drainage however chronic occlusion is also possible  No evidence of acute sinus thrombosis "  · CT A/P shows mild mediastinal lymphadenopathy concerning for metastatic disease, a few scattered tiny hepatic hypodensities for which metastatic disease cannot be excluded and multifocal bilateral upper and lower lobe pulmonary nodules   · Reportedly eval by ENT [place] and [facility] - mass unfortunately inoperable  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 Severe protein-calorie malnutrition ([facility] [address] )  Assessment & Plan  Malnutrition Findings:   Adult Malnutrition type: Acute illness  Adult Degree of Malnutrition: Other severe protein calorie malnutrition   · Malnutrition Characteristics: Fat loss, Muscle loss, Inadequate energy, Weight loss (PCM d/t dysphagia AEB 12 8% wt decrease x 2 weeks  Loss of muscle mass: temples depressed, clavicle clearly visible, loss in UE's,  Loss of fat mass: orbitals are dark and hollow  BMI Findings:  Adult BMI Classifications: Underweight < 18 5  Body mass index is 14 8 kg/m²  · Nutrition following    Hypodense mass of liver  Assessment & Plan  · CT C/A/P: "A few scattered tiny hepatic hypodensities statistically most likely representing cysts or hemangiomas    However, metastatic disease cannot be excluded "    Mediastinal lymphadenopathy  Assessment & Plan  · CT C/A/P: "Mild mediastinal lymphadenopathy which may be reactive in nature or secondary to metastatic disease "  · Likely represents metastatic dz     Narcotic dependency, continuous ([facility] [address] )  Assessment & Plan  · Per PDMP suboxone 8-2mg film BID    * Dysphagia  Assessment & Plan  · Dysphagia Secondary to oropharyngeal mass   · Pleasure feeds     Discharging Physician / Practitioner: Gil Perez PA-C  PCP: [facility],   Admission Date:   Admission Orders (From admission, onward)     Ordered        08/01/21 0028  INPATIENT ADMISSION  Once                   Discharge Date: 08/04/21    Medical Problems     Resolved Problems  Date Reviewed: 8/4/2021    None              Consultations During Hospital Stay:  · Hematology-Oncology  · Palliative care  · ENT    Procedures Performed:   · None    Significant Findings / Test Results:   · Chest x-ray with patchy nodular infiltrates bilaterally suspicious for multifocal pneumonia likely GNR +/-aspiration  · CT chest abdomen pelvis- Multifocal bilateral upper and lower lobe nodular groundglass airspace opacities, a few of which demonstrate mild cavitation  The findings are nonspecific with differential considerations including infectious or inflammatory process, such as covid-19 infection, versus metastatic disease  Correlation with laboratory studies is recommended  Mild mediastinal lymphadenopathy which may be reactive in nature or secondary to metastatic disease  No acute intra-abdominal abnormality  No free air  Small amount of pelvic free fluid  Moderate amount of stool noted throughout the colon  No evidence of large or small bowel obstruction  A few scattered tiny hepatic hypodensities statistically most likely representing cysts or hemangiomas  However, metastatic disease cannot be excluded  Nonemergent MRI of the abdomen is recommended for further characterization  · Hypokalemia    Incidental Findings:   · None    Test Results Pending at Discharge (will require follow up): · None     Outpatient Tests Requested:  · None    Complications:  Transition to hospice    Reason for Admission:  Dysphagia    Hospital Course:     Lexy Briseno is a 61 y o  male patient with past medical history significant for oropharyngeal cancer, narcotic dependency, history of polysubstance abuse, protein calorie malnutrition/severe, hypertension, anemia who originally presented to the hospital on 7/31/2021 due to dysphagia and shortness of breath  He was found to have multifocal pneumonia on imaging as well as extensive head and neck malignancy with potential metastatic disease to the lung, mediastinal lymph nodes and liver  Patient was apparently seen by ENT in the outpatient setting who deemed his tumor inoperable  Patient was also evaluated by [facility] who was in agreement that unfortunately his tumor is not amenable to surgical intervention and 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    Patient was evaluated by Hematology/Oncology and after extensive conversation had agreed to hospice care/comfort care measures on 08/02  Patient was evaluated by palliative Care as well and symptoms were managed  Patient was feeling better with improved spirits at time of discharge to hospice house  Please see above list of diagnoses and related plan for additional information  Condition at Discharge: Terminal     Discharge Day Visit / Exam:     Subjective:  Patient seen and examined at bedside  Appears comfortable  Able to tolerate some pleasure feeds  Vitals: Blood Pressure: 130/90 (08/04/21 0855)  Pulse: 91 (08/04/21 0855)  Temperature: (!) 97 2 °F (36 2 °C) (08/04/21 0855)  Temp Source: Oral (08/04/21 0855)  Respirations: 18 (08/04/21 0855)  Height: 6' (182 9 cm) (07/31/21 2118)  Weight - Scale: 49 5 kg (109 lb 2 oz) (08/01/21 0108)  SpO2: 94 % (08/04/21 0855)    Exam:   Physical Exam  Constitutional:       Appearance: He is ill-appearing and toxic-appearing  Comments: Cachexia   HENT:      Mouth/Throat:      Comments: Vocal cord paralysis  Cardiovascular:      Comments: Deferred  Pulmonary:      Effort: Pulmonary effort is normal    Abdominal:      General: Abdomen is flat  Musculoskeletal:         General: No swelling  Normal range of motion  Cervical back: Normal range of motion  Skin:     General: Skin is warm  Neurological:      Mental Status: He is alert  Cranial Nerves: Cranial nerve deficit present  Psychiatric:      Comments: Flat affect       Discussion with Family:  Wife at bedside    Discharge instructions/Information to patient and family:   See after visit summary for information provided to patient and family  Provisions for Follow-Up Care:  See after visit summary for information related to follow-up care and any pertinent home health orders        Disposition:     Other: [address] [place] [address] to [facility]:   · Not Applicable to this Patient - Not Applicable to this Patient    Planned Readmission:  Yes to hospice house Discharge Statement:  I spent 45 minutes discharging the patient  This time was spent on the day of discharge  I had direct contact with the patient on the day of discharge  Greater than 50% of the total time was spent examining patient, answering all patient questions, arranging and discussing plan of care with patient as well as directly providing post-discharge instructions  Additional time then spent on discharge activities  Discharge Medications:  See after visit summary for reconciled discharge medications provided to patient and family        ** Please Note: This note has been constructed using a voice recognition system **

## 2021-08-05 NOTE — UTILIZATION REVIEW
Notification of Discharge   This is a Notification of Discharge from our facility [address]  Please be advised that this patient has been discharge from our facility  Below you will find the admission and discharge date and time including the patients disposition  UTILIZATION REVIEW CONTACT:  Miguel Rivers  Utilization   Network Utilization Review Department  Phone: [phone] x carefully listen to the prompts  All voicemails are confidential   Email: [email or web address]     PHYSICIAN ADVISORY SERVICES:  FOR [facility] REVIEW - MEDICAL NECESSITY DENIAL  Phone: [phone]  Fax: [phone]  Email: [email or web address]     PRESENTATION DATE: 7/31/2021  9:12 PM  OBERVATION ADMISSION DATE:   INPATIENT ADMISSION DATE: 8/1/21 12:28 AM   DISCHARGE DATE: 8/4/2021  1:11 PM  DISPOSITION: Non [facility] Non [facility]      IMPORTANT INFORMATION:  Send all requests for admission clinical reviews, approved or denied determinations and any other requests to dedicated fax number below belonging to the campus where the patient is receiving treatment   List of dedicated fax numbers:  [address] [place] [address] DENIALS (Administrative/Medical Necessity) [phone]   [address]  (Maternity/NICU/Pediatrics) [phone]   Radha Masterosn [phone]   [facility] [phone]   [facility] [phone]   [facility] [address] [facility] [phone]   [facility]  [phone]   [address] [facility] [address] [facility]  [address] [facility] [address] [facility] [phone]

## 2021-08-06 LAB
BACTERIA BLD CULT: NORMAL
BACTERIA BLD CULT: NORMAL